# Patient Record
Sex: FEMALE | Race: WHITE | Employment: FULL TIME | URBAN - METROPOLITAN AREA
[De-identification: names, ages, dates, MRNs, and addresses within clinical notes are randomized per-mention and may not be internally consistent; named-entity substitution may affect disease eponyms.]

---

## 2017-08-16 PROBLEM — E06.3 HASHIMOTO'S DISEASE: Status: ACTIVE | Noted: 2017-08-16

## 2017-08-16 PROBLEM — R91.1 PULMONARY NODULE: Status: ACTIVE | Noted: 2017-02-22

## 2017-10-20 ENCOUNTER — HOSPITAL ENCOUNTER (OUTPATIENT)
Dept: LAB | Age: 57
Discharge: HOME | End: 2017-10-20

## 2017-10-20 PROCEDURE — 88305 TISSUE EXAM BY PATHOLOGIST: CPT | Performed by: INTERNAL MEDICINE

## 2018-09-28 ENCOUNTER — HOSPITAL ENCOUNTER (OUTPATIENT)
Dept: ULTRASOUND IMAGING | Age: 58
Discharge: HOME | End: 2018-09-28
Attending: FAMILY MEDICINE
Payer: COMMERCIAL

## 2018-09-28 ENCOUNTER — HOSPITAL ENCOUNTER (OUTPATIENT)
Dept: NON INVASIVE DIAGNOSTICS | Age: 58
Discharge: HOME | End: 2018-09-28
Attending: FAMILY MEDICINE
Payer: COMMERCIAL

## 2018-09-28 DIAGNOSIS — R07.89 CHEST TIGHTNESS OR PRESSURE: ICD-10-CM

## 2018-09-28 DIAGNOSIS — R55 PRE-SYNCOPE: ICD-10-CM

## 2018-09-28 PROCEDURE — 93306 TTE W/DOPPLER COMPLETE: CPT

## 2018-09-28 PROCEDURE — 93880 EXTRACRANIAL BILAT STUDY: CPT

## 2018-09-30 NOTE — PROGRESS NOTES
Let know Echo shows mild to moderate mitral valve regurgitation, but otherwise, ok. Shouldn't be causing her sxs. Carotid arteries are ok. Please proceed with MR Head without contrast due to persistent dizziness/pre-syncope.

## 2018-10-03 PROBLEM — J45.20 MILD INTERMITTENT ASTHMA WITHOUT COMPLICATION: Status: ACTIVE | Noted: 2018-10-03

## 2018-10-03 PROBLEM — J47.9 BRONCHIECTASIS WITHOUT COMPLICATION (HCC): Status: ACTIVE | Noted: 2018-10-03

## 2018-10-19 ENCOUNTER — HOSPITAL ENCOUNTER (OUTPATIENT)
Dept: MRI IMAGING | Age: 58
Discharge: HOME | End: 2018-10-19
Attending: FAMILY MEDICINE
Payer: COMMERCIAL

## 2018-10-19 DIAGNOSIS — R42 DIZZINESS: ICD-10-CM

## 2018-10-19 DIAGNOSIS — R26.81 GAIT INSTABILITY: ICD-10-CM

## 2018-10-19 DIAGNOSIS — H53.9 VISUAL CHANGES: ICD-10-CM

## 2018-10-19 PROCEDURE — 70551 MRI BRAIN STEM W/O DYE: CPT

## 2018-10-20 NOTE — PROGRESS NOTES
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 Please bring in for follow up with me when convenient.

## 2021-07-15 ENCOUNTER — HOSPITAL ENCOUNTER (OUTPATIENT)
Dept: MAMMOGRAPHY | Age: 61
Discharge: HOME | End: 2021-07-15
Attending: FAMILY MEDICINE
Payer: COMMERCIAL

## 2021-07-15 DIAGNOSIS — Z12.31 SCREENING MAMMOGRAM, ENCOUNTER FOR: ICD-10-CM

## 2021-07-15 PROCEDURE — 77067 SCR MAMMO BI INCL CAD: CPT

## 2021-07-30 PROBLEM — N93.0 POSTCOITAL BLEEDING: Status: ACTIVE | Noted: 2021-07-30

## 2021-08-20 PROBLEM — N94.10 DYSPAREUNIA IN FEMALE: Status: ACTIVE | Noted: 2021-08-20

## 2021-11-23 PROBLEM — E78.2 MIXED HYPERLIPIDEMIA: Status: ACTIVE | Noted: 2021-11-23

## 2022-03-18 PROBLEM — N93.0 POSTCOITAL BLEEDING: Status: ACTIVE | Noted: 2021-07-30

## 2022-03-19 PROBLEM — J45.20 MILD INTERMITTENT ASTHMA WITHOUT COMPLICATION: Status: ACTIVE | Noted: 2018-10-03

## 2022-03-19 PROBLEM — R91.1 PULMONARY NODULE: Status: ACTIVE | Noted: 2017-02-22

## 2022-03-19 PROBLEM — N94.10 DYSPAREUNIA IN FEMALE: Status: ACTIVE | Noted: 2021-08-20

## 2022-03-19 PROBLEM — E78.2 MIXED HYPERLIPIDEMIA: Status: ACTIVE | Noted: 2021-11-23

## 2022-03-20 PROBLEM — J47.9 BRONCHIECTASIS WITHOUT COMPLICATION (HCC): Status: ACTIVE | Noted: 2018-10-03

## 2022-03-20 PROBLEM — E06.3 HASHIMOTO'S DISEASE: Status: ACTIVE | Noted: 2017-08-16

## 2022-05-23 ENCOUNTER — OFFICE VISIT (OUTPATIENT)
Dept: INTERNAL MEDICINE CLINIC | Facility: CLINIC | Age: 62
End: 2022-05-23
Payer: COMMERCIAL

## 2022-05-23 VITALS
BODY MASS INDEX: 21.51 KG/M2 | RESPIRATION RATE: 16 BRPM | SYSTOLIC BLOOD PRESSURE: 116 MMHG | DIASTOLIC BLOOD PRESSURE: 68 MMHG | WEIGHT: 126 LBS | HEIGHT: 64 IN | OXYGEN SATURATION: 98 % | HEART RATE: 64 BPM

## 2022-05-23 DIAGNOSIS — F41.1 GENERALIZED ANXIETY DISORDER: ICD-10-CM

## 2022-05-23 DIAGNOSIS — R30.0 DYSURIA: ICD-10-CM

## 2022-05-23 DIAGNOSIS — J45.20 MILD INTERMITTENT ASTHMA WITHOUT COMPLICATION: ICD-10-CM

## 2022-05-23 DIAGNOSIS — R13.19 OTHER DYSPHAGIA: Primary | ICD-10-CM

## 2022-05-23 DIAGNOSIS — B37.31 VAGINAL YEAST INFECTION: ICD-10-CM

## 2022-05-23 PROCEDURE — 99214 OFFICE O/P EST MOD 30 MIN: CPT | Performed by: FAMILY MEDICINE

## 2022-05-23 RX ORDER — LORAZEPAM 0.5 MG/1
0.5 TABLET ORAL 2 TIMES DAILY
Qty: 60 TABLET | Refills: 2 | Status: SHIPPED | OUTPATIENT
Start: 2022-05-23 | End: 2022-10-14 | Stop reason: SDUPTHER

## 2022-05-23 RX ORDER — FLUCONAZOLE 150 MG/1
TABLET ORAL
Qty: 2 TABLET | Refills: 0 | Status: SHIPPED | OUTPATIENT
Start: 2022-05-23 | End: 2022-10-14

## 2022-05-23 RX ORDER — FLUTICASONE FUROATE AND VILANTEROL TRIFENATATE 100; 25 UG/1; UG/1
1 POWDER RESPIRATORY (INHALATION) DAILY
Qty: 1 EACH | Refills: 5 | Status: SHIPPED | OUTPATIENT
Start: 2022-05-23

## 2022-05-23 RX ORDER — PHENAZOPYRIDINE HYDROCHLORIDE 100 MG/1
100 TABLET, FILM COATED ORAL 3 TIMES DAILY PRN
Qty: 30 TABLET | Refills: 2 | Status: SHIPPED | OUTPATIENT
Start: 2022-05-23 | End: 2022-06-22

## 2022-05-23 ASSESSMENT — PATIENT HEALTH QUESTIONNAIRE - PHQ9
SUM OF ALL RESPONSES TO PHQ QUESTIONS 1-9: 0
2. FEELING DOWN, DEPRESSED OR HOPELESS: 0
SUM OF ALL RESPONSES TO PHQ QUESTIONS 1-9: 0
1. LITTLE INTEREST OR PLEASURE IN DOING THINGS: 0
SUM OF ALL RESPONSES TO PHQ QUESTIONS 1-9: 0
SUM OF ALL RESPONSES TO PHQ9 QUESTIONS 1 & 2: 0
SUM OF ALL RESPONSES TO PHQ QUESTIONS 1-9: 0

## 2022-05-23 ASSESSMENT — ANXIETY QUESTIONNAIRES
2. NOT BEING ABLE TO STOP OR CONTROL WORRYING: 0
5. BEING SO RESTLESS THAT IT IS HARD TO SIT STILL: 0
3. WORRYING TOO MUCH ABOUT DIFFERENT THINGS: 0
4. TROUBLE RELAXING: 0
6. BECOMING EASILY ANNOYED OR IRRITABLE: 0
7. FEELING AFRAID AS IF SOMETHING AWFUL MIGHT HAPPEN: 0
IF YOU CHECKED OFF ANY PROBLEMS ON THIS QUESTIONNAIRE, HOW DIFFICULT HAVE THESE PROBLEMS MADE IT FOR YOU TO DO YOUR WORK, TAKE CARE OF THINGS AT HOME, OR GET ALONG WITH OTHER PEOPLE: NOT DIFFICULT AT ALL
GAD7 TOTAL SCORE: 0
1. FEELING NERVOUS, ANXIOUS, OR ON EDGE: 0

## 2022-05-24 NOTE — PROGRESS NOTES
Rebecca Hager DO  Diplomate of the [employer] of [address] [place] [address] Internal Medicine      Catalina Gurrola (: 1960) is a 57 y.o. female, here for evaluation of the following chief complaint(s): Anxiety, Dysphagia, and Vaginitis       ASSESSMENT/PLAN:  1. Other dysphagia  -     [facility] - [facility]  2. Vaginal yeast infection  -     fluconazole (DIFLUCAN) 150 MG tablet; Take x 1 dose. May repeat in 48 hours if no better. , Disp-2 tablet, R-0Normal  3. Mild intermittent asthma without complication  -     fluticasone-vilanterol (BREO ELLIPTA) 100-25 MCG/INH AEPB inhaler; Inhale 1 puff into the lungs daily, Disp-1 each, R-5Normal  4. Generalized anxiety disorder  -     LORazepam (ATIVAN) 0.5 MG tablet; Take 1 tablet by mouth 2 times daily for 30 days. , Disp-60 tablet, R-2Normal  5. Dysuria  -     phenazopyridine (PYRIDIUM) 100 MG tablet; Take 1 tablet by mouth 3 times daily as needed (Dysuria), Disp-30 tablet, R-2Normal     -Aspiration precautions. Start zantac 150mg BID. Will get in with GI for endoscopy. -Diflucan as ordered for vaginal yeast infection.  -Will continue with Breo as she is getting good clinical benefit.  -Lorazepam for anxiety as tolerating without adverse effects. Advised her how to take medication properly. Discussed the potential side effects--including addiction--and benefits of the medication. She is to call with any problems or concerns.  -I have reviewed the patients controlled substance prescription history, as maintained in the [place] prescription monitoring program, so that the prescription(s) for a controlled substance can be given. -Pyridium prn for dysuria. SUBJECTIVE/OBJECTIVE:  HPI:  -Has been having some difficulty with swallowing over the past few months. Sometimes feels like her food is getting stuck. No aspiration.   Has been diagnosed with hiatal hernia in the past.  Also has intermittent GERD and has done well with zantac in the past.  -Has been having some mild vaginal discharge with itching. No foul smelling discharge.  -Has chronic intermittent dysuria and takes pyridium on prn basis for this.  -Follow up of asthma, not currently in exacerbation. Asthma symptoms occur: infrequently. Current limitations in activity from asthma: none. -Anxiety controlled with medication. Has tried SSRI/SNRI's in past and had adverse effects from them. ROS negative except as noted above today. Social History     Tobacco Use    Smoking status: Never Smoker    Smokeless tobacco: Never Used   Vaping Use    Vaping Use: Never used   Substance Use Topics    Alcohol use: No    Drug use: No     Vitals:    05/23/22 1615   BP: 116/68   Site: Left Upper Arm   Pulse: 64   Resp: 16   SpO2: 98%   Weight: 126 lb (57.2 kg)   Height: 5' 4\" (1.626 m)      Body mass index is 21.63 kg/m². Physical Exam  Vitals reviewed. Cardiovascular:      Rate and Rhythm: Normal rate and regular rhythm. Pulmonary:      Effort: Pulmonary effort is normal.      Breath sounds: Normal breath sounds. Abdominal:      General: Abdomen is flat. Bowel sounds are normal.      Palpations: Abdomen is soft. Tenderness: There is no abdominal tenderness. Skin:     General: Skin is warm and dry. Neurological:      Mental Status: She is alert. An electronic signature was used to authenticate this note.   Lorena Hernandez DO

## 2022-06-08 ENCOUNTER — NURSE ONLY (OUTPATIENT)
Dept: INTERNAL MEDICINE CLINIC | Facility: CLINIC | Age: 62
End: 2022-06-08
Payer: COMMERCIAL

## 2022-06-08 DIAGNOSIS — E53.8 B12 DEFICIENCY: Primary | ICD-10-CM

## 2022-06-08 PROCEDURE — 96372 THER/PROPH/DIAG INJ SC/IM: CPT | Performed by: FAMILY MEDICINE

## 2022-06-08 RX ORDER — CYANOCOBALAMIN 1000 UG/ML
1000 INJECTION INTRAMUSCULAR; SUBCUTANEOUS ONCE
Status: COMPLETED | OUTPATIENT
Start: 2022-06-08 | End: 2022-06-08

## 2022-06-08 RX ADMIN — CYANOCOBALAMIN 1000 MCG: 1000 INJECTION INTRAMUSCULAR; SUBCUTANEOUS at 16:10

## 2022-07-06 ENCOUNTER — NURSE ONLY (OUTPATIENT)
Dept: INTERNAL MEDICINE CLINIC | Facility: CLINIC | Age: 62
End: 2022-07-06
Payer: COMMERCIAL

## 2022-07-06 DIAGNOSIS — E53.8 B12 DEFICIENCY: Primary | ICD-10-CM

## 2022-07-06 PROCEDURE — 96372 THER/PROPH/DIAG INJ SC/IM: CPT | Performed by: FAMILY MEDICINE

## 2022-07-06 RX ORDER — CYANOCOBALAMIN 1000 UG/ML
1000 INJECTION INTRAMUSCULAR; SUBCUTANEOUS ONCE
Status: COMPLETED | OUTPATIENT
Start: 2022-07-06 | End: 2022-07-06

## 2022-07-06 RX ADMIN — CYANOCOBALAMIN 1000 MCG: 1000 INJECTION INTRAMUSCULAR; SUBCUTANEOUS at 09:40

## 2022-08-09 ENCOUNTER — NURSE ONLY (OUTPATIENT)
Dept: INTERNAL MEDICINE CLINIC | Facility: CLINIC | Age: 62
End: 2022-08-09
Payer: COMMERCIAL

## 2022-08-09 DIAGNOSIS — E53.8 B12 DEFICIENCY: Primary | ICD-10-CM

## 2022-08-09 PROCEDURE — 96372 THER/PROPH/DIAG INJ SC/IM: CPT | Performed by: INTERNAL MEDICINE

## 2022-08-09 RX ORDER — CYANOCOBALAMIN 1000 UG/ML
1000 INJECTION INTRAMUSCULAR; SUBCUTANEOUS ONCE
Status: COMPLETED | OUTPATIENT
Start: 2022-08-09 | End: 2022-08-09

## 2022-08-09 RX ADMIN — CYANOCOBALAMIN 1000 MCG: 1000 INJECTION INTRAMUSCULAR; SUBCUTANEOUS at 16:33

## 2022-09-08 ENCOUNTER — OFFICE VISIT (OUTPATIENT)
Dept: INTERNAL MEDICINE CLINIC | Facility: CLINIC | Age: 62
End: 2022-09-08
Payer: COMMERCIAL

## 2022-09-08 VITALS
HEART RATE: 68 BPM | SYSTOLIC BLOOD PRESSURE: 102 MMHG | HEIGHT: 64 IN | BODY MASS INDEX: 21.68 KG/M2 | DIASTOLIC BLOOD PRESSURE: 70 MMHG | OXYGEN SATURATION: 99 % | WEIGHT: 127 LBS

## 2022-09-08 DIAGNOSIS — E53.8 B12 DEFICIENCY: ICD-10-CM

## 2022-09-08 DIAGNOSIS — H93.8X1 SENSATION OF FULLNESS IN RIGHT EAR: Primary | ICD-10-CM

## 2022-09-08 PROCEDURE — 99213 OFFICE O/P EST LOW 20 MIN: CPT | Performed by: INTERNAL MEDICINE

## 2022-09-08 PROCEDURE — 96372 THER/PROPH/DIAG INJ SC/IM: CPT | Performed by: INTERNAL MEDICINE

## 2022-09-08 RX ORDER — OMEPRAZOLE 40 MG/1
CAPSULE, DELAYED RELEASE ORAL
COMMUNITY
Start: 2022-08-24 | End: 2022-10-14 | Stop reason: ALTCHOICE

## 2022-09-08 RX ORDER — CYANOCOBALAMIN 1000 UG/ML
1000 INJECTION INTRAMUSCULAR; SUBCUTANEOUS ONCE
Status: COMPLETED | OUTPATIENT
Start: 2022-09-08 | End: 2022-09-08

## 2022-09-08 RX ADMIN — CYANOCOBALAMIN 1000 MCG: 1000 INJECTION INTRAMUSCULAR; SUBCUTANEOUS at 11:58

## 2022-09-08 ASSESSMENT — ENCOUNTER SYMPTOMS
ABDOMINAL PAIN: 0
RHINORRHEA: 0
SORE THROAT: 0
COUGH: 0

## 2022-09-08 ASSESSMENT — PATIENT HEALTH QUESTIONNAIRE - PHQ9
2. FEELING DOWN, DEPRESSED OR HOPELESS: 0
SUM OF ALL RESPONSES TO PHQ9 QUESTIONS 1 & 2: 0
SUM OF ALL RESPONSES TO PHQ QUESTIONS 1-9: 0
1. LITTLE INTEREST OR PLEASURE IN DOING THINGS: 0
SUM OF ALL RESPONSES TO PHQ QUESTIONS 1-9: 0

## 2022-09-08 NOTE — PROGRESS NOTES
Dipti Martínez (:  1960) is a 60 y.o. female,Established patient, here for evaluation of the following chief complaint(s):  Otalgia (Right Ear/) and Injections (B-12)         ASSESSMENT/PLAN:  1. Sensation of fullness in right ear  2. B12 deficiency  -     cyanocobalamin injection 1,000 mcg; 1,000 mcg, IntraMUSCular, ONCE, 1 dose, On Thu 22 at 1215      Return if symptoms worsen or fail to improve. Patient with fullness in right ear, pain that resolved  Bilateral ears with clear serous fluid  Discussed using nasal spray during season changes, nasal saline  Gentle insufflation  F/u if worsening or no improvement      Subjective   SUBJECTIVE/OBJECTIVE:  Otalgia   There is pain in the right ear. This is a new problem. The current episode started 1 to 4 weeks ago. The problem has been gradually improving. There has been no fever. The pain is mild. Pertinent negatives include no abdominal pain, coughing, ear discharge, headaches, hearing loss, neck pain, rhinorrhea or sore throat. Associated symptoms comments: Fluttering noise and sensation in the right ear  . Review of Systems   HENT:  Positive for ear pain. Negative for ear discharge, hearing loss, rhinorrhea and sore throat. Respiratory:  Negative for cough. Gastrointestinal:  Negative for abdominal pain. Musculoskeletal:  Negative for neck pain. Neurological:  Negative for headaches. Objective   Physical Exam  Constitutional:       Appearance: Normal appearance. She is not ill-appearing. HENT:      Head: Normocephalic. Right Ear: Ear canal and external ear normal. A middle ear effusion is present. Tympanic membrane is not erythematous. Left Ear: Ear canal and external ear normal. A middle ear effusion is present. Tympanic membrane is not erythematous. Nose: Nose normal. No congestion. Eyes:      Extraocular Movements: Extraocular movements intact. Pupils: Pupils are equal, round, and reactive to light.

## 2022-10-06 ENCOUNTER — NURSE ONLY (OUTPATIENT)
Dept: INTERNAL MEDICINE CLINIC | Facility: CLINIC | Age: 62
End: 2022-10-06
Payer: COMMERCIAL

## 2022-10-06 DIAGNOSIS — E53.8 B12 DEFICIENCY: Primary | ICD-10-CM

## 2022-10-06 PROCEDURE — 96372 THER/PROPH/DIAG INJ SC/IM: CPT | Performed by: FAMILY MEDICINE

## 2022-10-06 RX ORDER — CYANOCOBALAMIN 1000 UG/ML
1000 INJECTION INTRAMUSCULAR; SUBCUTANEOUS ONCE
Status: COMPLETED | OUTPATIENT
Start: 2022-10-06 | End: 2022-10-06

## 2022-10-06 RX ADMIN — CYANOCOBALAMIN 1000 MCG: 1000 INJECTION INTRAMUSCULAR; SUBCUTANEOUS at 08:58

## 2022-10-14 ENCOUNTER — OFFICE VISIT (OUTPATIENT)
Dept: INTERNAL MEDICINE CLINIC | Facility: CLINIC | Age: 62
End: 2022-10-14
Payer: COMMERCIAL

## 2022-10-14 VITALS
RESPIRATION RATE: 16 BRPM | HEART RATE: 78 BPM | BODY MASS INDEX: 20.32 KG/M2 | SYSTOLIC BLOOD PRESSURE: 120 MMHG | WEIGHT: 119 LBS | OXYGEN SATURATION: 100 % | DIASTOLIC BLOOD PRESSURE: 70 MMHG | HEIGHT: 64 IN

## 2022-10-14 DIAGNOSIS — K21.9 GASTROESOPHAGEAL REFLUX DISEASE WITHOUT ESOPHAGITIS: ICD-10-CM

## 2022-10-14 DIAGNOSIS — F41.1 GENERALIZED ANXIETY DISORDER: ICD-10-CM

## 2022-10-14 DIAGNOSIS — K22.2 ESOPHAGEAL STRICTURE: Primary | ICD-10-CM

## 2022-10-14 PROCEDURE — 99214 OFFICE O/P EST MOD 30 MIN: CPT | Performed by: FAMILY MEDICINE

## 2022-10-14 RX ORDER — PANTOPRAZOLE SODIUM 40 MG/1
TABLET, DELAYED RELEASE ORAL
COMMUNITY
Start: 2022-10-02 | End: 2022-10-14 | Stop reason: ALTCHOICE

## 2022-10-14 RX ORDER — FAMOTIDINE 20 MG/1
20 TABLET, FILM COATED ORAL 2 TIMES DAILY PRN
Qty: 180 TABLET | Refills: 1 | Status: SHIPPED | OUTPATIENT
Start: 2022-10-14

## 2022-10-14 RX ORDER — LORAZEPAM 0.5 MG/1
0.5 TABLET ORAL 2 TIMES DAILY
Qty: 60 TABLET | Refills: 2 | Status: SHIPPED | OUTPATIENT
Start: 2022-10-14 | End: 2022-11-13

## 2022-10-14 ASSESSMENT — PATIENT HEALTH QUESTIONNAIRE - PHQ9
SUM OF ALL RESPONSES TO PHQ QUESTIONS 1-9: 0
SUM OF ALL RESPONSES TO PHQ QUESTIONS 1-9: 0
1. LITTLE INTEREST OR PLEASURE IN DOING THINGS: 0
SUM OF ALL RESPONSES TO PHQ QUESTIONS 1-9: 0
2. FEELING DOWN, DEPRESSED OR HOPELESS: 0
SUM OF ALL RESPONSES TO PHQ QUESTIONS 1-9: 0
SUM OF ALL RESPONSES TO PHQ9 QUESTIONS 1 & 2: 0

## 2022-10-14 NOTE — PROGRESS NOTES
Curt Herron DO  Diplomate of the [employer] of [facility] Internal Medicine      Catarina Red (: 1960) is a 60 y.o. female, here for evaluation of the following chief complaint(s):  Gastroesophageal Reflux       ASSESSMENT/PLAN:  1. Esophageal stricture  -     famotidine (PEPCID) 20 MG tablet; Take 1 tablet by mouth 2 times daily as needed (Indigestion), Disp-180 tablet, R-1Normal  2. Gastroesophageal reflux disease without esophagitis  -     famotidine (PEPCID) 20 MG tablet; Take 1 tablet by mouth 2 times daily as needed (Indigestion), Disp-180 tablet, R-1Normal  3. Generalized anxiety disorder  -     LORazepam (ATIVAN) 0.5 MG tablet; Take 1 tablet by mouth 2 times daily for 30 days. , Disp-60 tablet, R-2Normal     -Anti reflux precautions. Will use pepcid daily-BID prn. Monitor.  -Will continue with Breo as tolerating well and providing good clinical beneift.  -Advised to get age appropriate flu vaccine.  -Tolerating medication well without adverse effects and providing good therapeutic benefit for anxiety. Will continue with ativan prn and advised to let us know for any worsening symptoms. -Advised her how to take medication properly. Discussed the potential side effects--including addiction--and benefits of the medication. She is to call with any problems or concerns.  -I have reviewed the patients controlled substance prescription history, as maintained in the [place] prescription monitoring program, so that the prescription(s) for a controlled substance can be given. -Reviewed GI consult. SUBJECTIVE/OBJECTIVE:  HPI:  -Pt recently underwent esophageal dilation, doing much better now. Swallowing well.  -Had allergic reaction to ppi's. States she developed rash on chest.  Improving after stopping. Still having occasional reflux sxs.   -Continues to use Breo daily. Has not had to use albuterol. No productive cough/SOB.   -Anxiety stable, has been under more stress due to losing job. Moods stable. Tolerating medication well. ROS negative except as noted above today. Social History     Tobacco Use    Smoking status: Never    Smokeless tobacco: Never   Vaping Use    Vaping Use: Never used   Substance Use Topics    Alcohol use: No    Drug use: No     Vitals:    10/14/22 0816   BP: 120/70   Site: Left Upper Arm   Position: Sitting   Pulse: 78   Resp: 16   SpO2: 100%   Weight: 119 lb (54 kg)   Height: 5' 4\" (1.626 m)      Body mass index is 20.43 kg/m². Physical Exam  Vitals reviewed. Cardiovascular:      Rate and Rhythm: Normal rate and regular rhythm. Pulmonary:      Effort: Pulmonary effort is normal.      Breath sounds: Normal breath sounds. Abdominal:      General: Abdomen is flat. Bowel sounds are normal.      Palpations: Abdomen is soft. Tenderness: There is no abdominal tenderness. Skin:     General: Skin is warm and dry. Neurological:      Mental Status: She is alert. An electronic signature was used to authenticate this note.   Mari Butts DO

## 2022-11-07 ENCOUNTER — NURSE ONLY (OUTPATIENT)
Dept: INTERNAL MEDICINE CLINIC | Facility: CLINIC | Age: 62
End: 2022-11-07
Payer: COMMERCIAL

## 2022-11-07 DIAGNOSIS — E53.8 B12 DEFICIENCY: Primary | ICD-10-CM

## 2022-11-07 PROCEDURE — 96372 THER/PROPH/DIAG INJ SC/IM: CPT | Performed by: FAMILY MEDICINE

## 2022-11-07 RX ORDER — CYANOCOBALAMIN 1000 UG/ML
1000 INJECTION, SOLUTION INTRAMUSCULAR; SUBCUTANEOUS ONCE
Status: COMPLETED | OUTPATIENT
Start: 2022-11-07 | End: 2022-11-07

## 2022-11-07 RX ADMIN — CYANOCOBALAMIN 1000 MCG: 1000 INJECTION, SOLUTION INTRAMUSCULAR; SUBCUTANEOUS at 08:57

## 2022-12-05 ENCOUNTER — NURSE ONLY (OUTPATIENT)
Dept: INTERNAL MEDICINE CLINIC | Facility: CLINIC | Age: 62
End: 2022-12-05
Payer: COMMERCIAL

## 2022-12-05 DIAGNOSIS — E53.8 B12 DEFICIENCY: Primary | ICD-10-CM

## 2022-12-05 PROCEDURE — 96372 THER/PROPH/DIAG INJ SC/IM: CPT | Performed by: FAMILY MEDICINE

## 2022-12-05 RX ORDER — CYANOCOBALAMIN 1000 UG/ML
1000 INJECTION, SOLUTION INTRAMUSCULAR; SUBCUTANEOUS ONCE
Status: COMPLETED | OUTPATIENT
Start: 2022-12-05 | End: 2022-12-05

## 2022-12-05 RX ADMIN — CYANOCOBALAMIN 1000 MCG: 1000 INJECTION, SOLUTION INTRAMUSCULAR; SUBCUTANEOUS at 08:50

## 2023-01-05 ENCOUNTER — NURSE ONLY (OUTPATIENT)
Dept: INTERNAL MEDICINE CLINIC | Facility: CLINIC | Age: 63
End: 2023-01-05
Payer: COMMERCIAL

## 2023-01-05 DIAGNOSIS — E53.8 B12 DEFICIENCY: Primary | ICD-10-CM

## 2023-01-05 PROCEDURE — 96372 THER/PROPH/DIAG INJ SC/IM: CPT | Performed by: INTERNAL MEDICINE

## 2023-01-05 RX ORDER — CYANOCOBALAMIN 1000 UG/ML
1000 INJECTION, SOLUTION INTRAMUSCULAR; SUBCUTANEOUS
Status: SHIPPED | OUTPATIENT
Start: 2023-01-05

## 2023-01-05 RX ADMIN — CYANOCOBALAMIN 1000 MCG: 1000 INJECTION, SOLUTION INTRAMUSCULAR; SUBCUTANEOUS at 08:44

## 2023-01-16 ENCOUNTER — OFFICE VISIT (OUTPATIENT)
Dept: INTERNAL MEDICINE CLINIC | Facility: CLINIC | Age: 63
End: 2023-01-16
Payer: COMMERCIAL

## 2023-01-16 VITALS
HEART RATE: 64 BPM | HEIGHT: 64 IN | OXYGEN SATURATION: 98 % | BODY MASS INDEX: 18.95 KG/M2 | WEIGHT: 111 LBS | DIASTOLIC BLOOD PRESSURE: 70 MMHG | RESPIRATION RATE: 16 BRPM | SYSTOLIC BLOOD PRESSURE: 130 MMHG

## 2023-01-16 DIAGNOSIS — E06.3 HASHIMOTO'S DISEASE: ICD-10-CM

## 2023-01-16 DIAGNOSIS — J47.9 BRONCHIECTASIS, UNCOMPLICATED (HCC): Primary | ICD-10-CM

## 2023-01-16 DIAGNOSIS — F41.0 PANIC DISORDER WITHOUT AGORAPHOBIA: ICD-10-CM

## 2023-01-16 DIAGNOSIS — E78.2 MIXED HYPERLIPIDEMIA: ICD-10-CM

## 2023-01-16 DIAGNOSIS — K21.9 GASTROESOPHAGEAL REFLUX DISEASE WITHOUT ESOPHAGITIS: ICD-10-CM

## 2023-01-16 DIAGNOSIS — J45.20 MILD INTERMITTENT ASTHMA WITHOUT COMPLICATION: ICD-10-CM

## 2023-01-16 PROBLEM — N93.0 POSTCOITAL BLEEDING: Status: RESOLVED | Noted: 2021-07-30 | Resolved: 2023-01-16

## 2023-01-16 LAB
ERYTHROCYTE [DISTWIDTH] IN BLOOD BY AUTOMATED COUNT: 13.2 % (ref 11.9–14.6)
HCT VFR BLD AUTO: 40.2 % (ref 35.8–46.3)
HGB BLD-MCNC: 12.7 G/DL (ref 11.7–15.4)
MCH RBC QN AUTO: 29.1 PG (ref 26.1–32.9)
MCHC RBC AUTO-ENTMCNC: 31.6 G/DL (ref 31.4–35)
MCV RBC AUTO: 92.2 FL (ref 82–102)
NRBC # BLD: 0 K/UL (ref 0–0.2)
PLATELET # BLD AUTO: 261 K/UL (ref 150–450)
PMV BLD AUTO: 12.4 FL (ref 9.4–12.3)
RBC # BLD AUTO: 4.36 M/UL (ref 4.05–5.2)
WBC # BLD AUTO: 8.7 K/UL (ref 4.3–11.1)

## 2023-01-16 PROCEDURE — 99214 OFFICE O/P EST MOD 30 MIN: CPT | Performed by: FAMILY MEDICINE

## 2023-01-16 RX ORDER — LORAZEPAM 0.5 MG/1
TABLET ORAL
COMMUNITY
Start: 2022-12-14 | End: 2023-01-16 | Stop reason: SDUPTHER

## 2023-01-16 RX ORDER — FAMOTIDINE 20 MG/1
20 TABLET, FILM COATED ORAL 2 TIMES DAILY PRN
Qty: 180 TABLET | Refills: 1 | Status: SHIPPED | OUTPATIENT
Start: 2023-01-16

## 2023-01-16 RX ORDER — FLUTICASONE FUROATE AND VILANTEROL 100; 25 UG/1; UG/1
1 POWDER RESPIRATORY (INHALATION) DAILY
Qty: 1 EACH | Refills: 5 | Status: SHIPPED | OUTPATIENT
Start: 2023-01-16

## 2023-01-16 RX ORDER — LORAZEPAM 0.5 MG/1
0.5 TABLET ORAL DAILY PRN
Qty: 30 TABLET | Refills: 2 | Status: SHIPPED | OUTPATIENT
Start: 2023-01-16 | End: 2023-02-15

## 2023-01-16 RX ORDER — FLUTICASONE FUROATE AND VILANTEROL 100; 25 UG/1; UG/1
POWDER RESPIRATORY (INHALATION)
Qty: 1 EACH | Refills: 5 | Status: CANCELLED | OUTPATIENT
Start: 2023-01-16

## 2023-01-16 RX ORDER — FLUTICASONE PROPIONATE 50 MCG
2 SPRAY, SUSPENSION (ML) NASAL DAILY
Qty: 16 G | Refills: 5 | Status: SHIPPED | OUTPATIENT
Start: 2023-01-16

## 2023-01-16 SDOH — ECONOMIC STABILITY: FOOD INSECURITY: WITHIN THE PAST 12 MONTHS, THE FOOD YOU BOUGHT JUST DIDN'T LAST AND YOU DIDN'T HAVE MONEY TO GET MORE.: NEVER TRUE

## 2023-01-16 SDOH — ECONOMIC STABILITY: FOOD INSECURITY: WITHIN THE PAST 12 MONTHS, YOU WORRIED THAT YOUR FOOD WOULD RUN OUT BEFORE YOU GOT MONEY TO BUY MORE.: NEVER TRUE

## 2023-01-16 ASSESSMENT — PATIENT HEALTH QUESTIONNAIRE - PHQ9
SUM OF ALL RESPONSES TO PHQ QUESTIONS 1-9: 0
SUM OF ALL RESPONSES TO PHQ9 QUESTIONS 1 & 2: 0
1. LITTLE INTEREST OR PLEASURE IN DOING THINGS: 0
SUM OF ALL RESPONSES TO PHQ QUESTIONS 1-9: 0
2. FEELING DOWN, DEPRESSED OR HOPELESS: 0

## 2023-01-16 ASSESSMENT — SOCIAL DETERMINANTS OF HEALTH (SDOH): HOW HARD IS IT FOR YOU TO PAY FOR THE VERY BASICS LIKE FOOD, HOUSING, MEDICAL CARE, AND HEATING?: NOT HARD AT ALL

## 2023-01-16 NOTE — PROGRESS NOTES
Abbey Preston DO  Diplomate of the [employer] of [address] [place] [address] Internal Medicine      Domingo Conroy (: 1960) is a 60 y.o. female, here for evaluation of the following chief complaint(s):  Gastroesophageal Reflux and Asthma       ASSESSMENT/PLAN:  1. Bronchiectasis, uncomplicated (HCC)  -     Fluticasone Furoate-Vilanterol (BREO ELLIPTA) 100-25 MCG/ACT AEPB; Inhale 1 puff into the lungs daily, Disp-1 each, R-5Normal  -     CBC; Future  2. Gastroesophageal reflux disease without esophagitis  -     famotidine (PEPCID) 20 MG tablet; Take 1 tablet by mouth 2 times daily as needed (Indigestion), Disp-180 tablet, R-1Normal  3. Panic disorder without agoraphobia  -     LORazepam (ATIVAN) 0.5 MG tablet; Take 1 tablet by mouth daily as needed for Anxiety for up to 30 days. Max Daily Amount: 0.5 mg, Disp-30 tablet, R-2Normal  4. Hashimoto's disease  -     TSH with Reflex; Future  5. Mild intermittent asthma without complication  6. Mixed hyperlipidemia  -     Basic Metabolic Panel; Future  -     Hepatic Function Panel; Future  -     Lipid Panel; Future     Will check CT to ensure stability of bronchiectasis. Will continue with Breo daily as she is getting good clinical benefit from this and tolerating well. Pepcid daily to twice daily as needed for reflux symptoms. Will continue with lorazepam as needed for severe panic. Advised her how to take medication properly. Discussed the potential side effects--including addiction--and benefits of the medication. She is to call with any problems or concerns. I have reviewed the patients controlled substance prescription history, as maintained in the [place] prescription monitoring program, so that the prescription(s) for a controlled substance can be given. Recheck TSH. Recheck lipids and encourage healthy eating and exercise as able. SUBJECTIVE/OBJECTIVE:  HPI:  Breathing has been fairly stable.   She still has some intermittent problems with some congestion and shortness of breath at times. No fevers. No hemoptysis. She does continue on Breo daily. GERD has been controlled with Pepcid. She only takes intermittently. Continues to have a lot of issues with anxiety and panic at times. She has been on multiple different medications before and had different issues with tolerance to this. She does take lorazepam for severe symptoms and gets relief without significant adverse effects. ROS negative except as noted above today. Social History     Tobacco Use    Smoking status: Never    Smokeless tobacco: Never   Vaping Use    Vaping Use: Never used   Substance Use Topics    Alcohol use: No    Drug use: No     Vitals:    01/16/23 1019   BP: 130/70   Site: Left Upper Arm   Position: Sitting   Pulse: 64   Resp: 16   SpO2: 98%   Weight: 111 lb (50.3 kg)   Height: 5' 4\" (1.626 m)      Body mass index is 19.05 kg/m². Physical Exam  Vitals reviewed. HENT:      Head: Normocephalic and atraumatic. Mouth/Throat:      Mouth: Mucous membranes are moist.      Pharynx: Oropharynx is clear. Eyes:      Extraocular Movements: Extraocular movements intact. Conjunctiva/sclera: Conjunctivae normal.      Pupils: Pupils are equal, round, and reactive to light. Cardiovascular:      Rate and Rhythm: Normal rate and regular rhythm. Pulmonary:      Effort: Pulmonary effort is normal.      Comments: Decreased BS in AM  Abdominal:      General: Abdomen is flat. Palpations: Abdomen is soft. Musculoskeletal:         General: Normal range of motion. Cervical back: Normal range of motion and neck supple. Skin:     General: Skin is warm and dry. Neurological:      General: No focal deficit present. Mental Status: She is alert. An electronic signature was used to authenticate this note.   Amelia Bahena DO

## 2023-01-17 LAB
ALBUMIN SERPL-MCNC: 3.9 G/DL (ref 3.2–4.6)
ALBUMIN/GLOB SERPL: 1.1 (ref 0.4–1.6)
ALP SERPL-CCNC: 80 U/L (ref 50–136)
ALT SERPL-CCNC: 15 U/L (ref 12–65)
ANION GAP SERPL CALC-SCNC: 8 MMOL/L (ref 2–11)
AST SERPL-CCNC: 17 U/L (ref 15–37)
BILIRUB DIRECT SERPL-MCNC: <0.1 MG/DL
BILIRUB SERPL-MCNC: 0.9 MG/DL (ref 0.2–1.1)
BUN SERPL-MCNC: 14 MG/DL (ref 8–23)
CALCIUM SERPL-MCNC: 9.9 MG/DL (ref 8.3–10.4)
CHLORIDE SERPL-SCNC: 108 MMOL/L (ref 101–110)
CHOLEST SERPL-MCNC: 229 MG/DL
CO2 SERPL-SCNC: 26 MMOL/L (ref 21–32)
CREAT SERPL-MCNC: 1.1 MG/DL (ref 0.6–1)
GLOBULIN SER CALC-MCNC: 3.4 G/DL (ref 2.8–4.5)
GLUCOSE SERPL-MCNC: 100 MG/DL (ref 65–100)
HDLC SERPL-MCNC: 91 MG/DL (ref 40–60)
HDLC SERPL: 2.5
LDLC SERPL CALC-MCNC: 112.4 MG/DL
POTASSIUM SERPL-SCNC: 4.1 MMOL/L (ref 3.5–5.1)
PROT SERPL-MCNC: 7.3 G/DL (ref 6.3–8.2)
SODIUM SERPL-SCNC: 142 MMOL/L (ref 133–143)
TRIGL SERPL-MCNC: 128 MG/DL (ref 35–150)
TSH W FREE THYROID IF ABNORMAL: 1.05 UIU/ML (ref 0.36–3.74)
VLDLC SERPL CALC-MCNC: 25.6 MG/DL (ref 6–23)

## 2023-01-24 NOTE — RESULT ENCOUNTER NOTE
Let know pulmonary nodule a little bigger. Would like her to see Pulmonology to see if further evaluation needs to be done--pls refer-she has seen previously. Also, appears to have some benign cysts in liver--has had previously, but would like liver us to further evaluate--pls order.

## 2023-01-25 ENCOUNTER — OFFICE VISIT (OUTPATIENT)
Dept: INTERNAL MEDICINE CLINIC | Facility: CLINIC | Age: 63
End: 2023-01-25
Payer: COMMERCIAL

## 2023-01-25 VITALS
OXYGEN SATURATION: 99 % | WEIGHT: 108 LBS | RESPIRATION RATE: 16 BRPM | HEIGHT: 64 IN | BODY MASS INDEX: 18.44 KG/M2 | HEART RATE: 90 BPM | DIASTOLIC BLOOD PRESSURE: 78 MMHG | SYSTOLIC BLOOD PRESSURE: 138 MMHG

## 2023-01-25 DIAGNOSIS — R91.8 PULMONARY NODULES: ICD-10-CM

## 2023-01-25 DIAGNOSIS — K76.9 LIVER LESION: ICD-10-CM

## 2023-01-25 DIAGNOSIS — J47.9 BRONCHIECTASIS WITHOUT COMPLICATION (HCC): Primary | ICD-10-CM

## 2023-01-25 PROCEDURE — 99214 OFFICE O/P EST MOD 30 MIN: CPT | Performed by: FAMILY MEDICINE

## 2023-01-25 ASSESSMENT — PATIENT HEALTH QUESTIONNAIRE - PHQ9
SUM OF ALL RESPONSES TO PHQ QUESTIONS 1-9: 0
SUM OF ALL RESPONSES TO PHQ QUESTIONS 1-9: 0
1. LITTLE INTEREST OR PLEASURE IN DOING THINGS: 0
2. FEELING DOWN, DEPRESSED OR HOPELESS: 0
SUM OF ALL RESPONSES TO PHQ QUESTIONS 1-9: 0
SUM OF ALL RESPONSES TO PHQ QUESTIONS 1-9: 0
SUM OF ALL RESPONSES TO PHQ9 QUESTIONS 1 & 2: 0

## 2023-01-25 NOTE — PROGRESS NOTES
Lorin Sage DO  Diplomate of the [employer] of [address] [place] [address] Internal Medicine      Kimber Wells (: 1960) is a 60 y.o. female, here for evaluation of the following chief complaint(s):  Results       ASSESSMENT/PLAN:  1. Bronchiectasis without complication [facility])  -     External Referral to Pulmonology  2. Pulmonary nodules  -     External Referral to Pulmonology  3. Liver lesion    Reviewed CT results in detail with the patient. We are going to get her back in with her pulmonologist that she has not followed up with him in quite a while. We will let them determine next appropriate steps as far as work-up is concerned. Will continue with her Breo as providing clinical benefit--she states her symptoms improve as long as she is taking. We will go ahead and check MRI of liver to further evaluate lesions. Of note she did have these previously several years ago, but we will go ahead and update imaging to make sure no further change. I answered all the patient's questions to the best of my ability. SUBJECTIVE/OBJECTIVE:  HPI:  Patient comes in today for follow-up regarding her recent CT results. She does have a long history of bronchiectasis. She has seen pulmonology in the past, but has not followed up with them recently. She does admit to intermittently having some right anterior chest discomfort, especially with deep breathing. She has been using her inhaler daily. Denies any significant mucopurulent productive cough or hemoptysis. No fevers. ROS negative except as noted above today.     Social History     Tobacco Use    Smoking status: Never    Smokeless tobacco: Never   Vaping Use    Vaping Use: Never used   Substance Use Topics    Alcohol use: No    Drug use: No     Vitals:    23 1024   BP: 138/78   Site: Left Upper Arm   Position: Sitting   Pulse: 90   Resp: 16   SpO2: 99%   Weight: 108 lb (49 kg)   Height: 5' 4\" (1.626 m)      Body mass index is 18.54 kg/m². Physical Exam  Vitals reviewed. Cardiovascular:      Rate and Rhythm: Normal rate and regular rhythm. Pulmonary:      Effort: Pulmonary effort is normal.      Breath sounds: Normal breath sounds. Skin:     General: Skin is warm and dry. Neurological:      Mental Status: She is alert. An electronic signature was used to authenticate this note.   Surinder Santiago DO

## 2023-01-31 ENCOUNTER — OFFICE VISIT (OUTPATIENT)
Dept: INTERNAL MEDICINE CLINIC | Facility: CLINIC | Age: 63
End: 2023-01-31
Payer: COMMERCIAL

## 2023-01-31 VITALS
HEIGHT: 64 IN | BODY MASS INDEX: 18.44 KG/M2 | OXYGEN SATURATION: 98 % | SYSTOLIC BLOOD PRESSURE: 110 MMHG | HEART RATE: 82 BPM | DIASTOLIC BLOOD PRESSURE: 80 MMHG | WEIGHT: 108 LBS

## 2023-01-31 DIAGNOSIS — R91.1 PULMONARY NODULE: ICD-10-CM

## 2023-01-31 DIAGNOSIS — R04.2 COUGH WITH HEMOPTYSIS: Primary | ICD-10-CM

## 2023-01-31 DIAGNOSIS — J47.9 BRONCHIECTASIS WITHOUT COMPLICATION (HCC): ICD-10-CM

## 2023-01-31 PROCEDURE — 99214 OFFICE O/P EST MOD 30 MIN: CPT | Performed by: NURSE PRACTITIONER

## 2023-01-31 ASSESSMENT — PATIENT HEALTH QUESTIONNAIRE - PHQ9
SUM OF ALL RESPONSES TO PHQ QUESTIONS 1-9: 0
2. FEELING DOWN, DEPRESSED OR HOPELESS: 0
SUM OF ALL RESPONSES TO PHQ9 QUESTIONS 1 & 2: 0
SUM OF ALL RESPONSES TO PHQ QUESTIONS 1-9: 0
1. LITTLE INTEREST OR PLEASURE IN DOING THINGS: 0

## 2023-01-31 NOTE — PROGRESS NOTES
Uriel Eaton (:  1960) is a 60 y.o. female,Established patient, here for evaluation of the following chief complaint(s): Other (Esophageal concern/)         ASSESSMENT/PLAN:  1. Cough with hemoptysis  2. Pulmonary nodule  3. Bronchiectasis without complication ([facility] [address]    No follow-ups on file. Patient with 1 episode of cough with hemoptysis  Known pulmonary nodule, has referral to pulmonary  Reviewed prior CT  Discussed with PCP, will check quantiferon   Lungs clear and  pulse ox normal      Subjective   SUBJECTIVE/OBJECTIVE:  Patient is here for a concern that yesterday she coughed hard, after her cough she felt burning in her chest. Then she was able to cough up some mucus. Then when she went to the bathroom she spit in the toilet and there was blood in her spit. She hasn't had a cough or cold, she just coughed 1 x but very hard. She only spit up the blood the 1 x. She hasn't coughed since or had any more production of blood. She had esophageal procedure 5 months ago that concerned her as a source of blood. She also has a pulmonary nodule. Other  This is a new problem. The current episode started yesterday. Review of Systems       Objective   Physical Exam  Constitutional:       Appearance: Normal appearance. She is not ill-appearing. HENT:      Head: Normocephalic. Eyes:      Extraocular Movements: Extraocular movements intact. Pupils: Pupils are equal, round, and reactive to light. Cardiovascular:      Rate and Rhythm: Normal rate and regular rhythm. Pulmonary:      Effort: Pulmonary effort is normal.      Breath sounds: Normal breath sounds. No wheezing. Comments: clear  Musculoskeletal:      Cervical back: Neck supple. Neurological:      General: No focal deficit present. Mental Status: She is alert and oriented to person, place, and time.    Psychiatric:         Mood and Affect: Mood normal.         Behavior: Behavior normal.                An electronic signature was used to authenticate this note.     --Minh Bernard, APRARTI - CNP

## 2023-02-05 LAB
M TB IFN-G BLD-IMP: NEGATIVE
M TB IFN-G CD4+ T-CELLS BLD-ACNC: 0.1 IU/ML
M TBIFN-G CD4+ CD8+T-CELLS BLD-ACNC: 0.08 IU/ML
QUANTIFERON CRITERIA: NORMAL
QUANTIFERON MITOGEN VALUE: >10 IU/ML
QUANTIFERON NIL VALUE: 0.11 IU/ML
QUANTIFERON, INCUBATION: NORMAL

## 2023-02-06 ENCOUNTER — OFFICE VISIT (OUTPATIENT)
Dept: INTERNAL MEDICINE CLINIC | Facility: CLINIC | Age: 63
End: 2023-02-06
Payer: COMMERCIAL

## 2023-02-06 VITALS
WEIGHT: 108 LBS | HEIGHT: 64 IN | OXYGEN SATURATION: 99 % | SYSTOLIC BLOOD PRESSURE: 110 MMHG | HEART RATE: 64 BPM | BODY MASS INDEX: 18.44 KG/M2 | DIASTOLIC BLOOD PRESSURE: 80 MMHG

## 2023-02-06 DIAGNOSIS — J47.9 BRONCHIECTASIS WITHOUT COMPLICATION (HCC): ICD-10-CM

## 2023-02-06 DIAGNOSIS — R04.2 COUGH WITH HEMOPTYSIS: Primary | ICD-10-CM

## 2023-02-06 DIAGNOSIS — R91.1 PULMONARY NODULE: ICD-10-CM

## 2023-02-06 PROCEDURE — 99213 OFFICE O/P EST LOW 20 MIN: CPT | Performed by: NURSE PRACTITIONER

## 2023-02-06 PROCEDURE — 96372 THER/PROPH/DIAG INJ SC/IM: CPT | Performed by: NURSE PRACTITIONER

## 2023-02-06 RX ADMIN — CYANOCOBALAMIN 1000 MCG: 1000 INJECTION, SOLUTION INTRAMUSCULAR; SUBCUTANEOUS at 08:28

## 2023-02-06 SDOH — ECONOMIC STABILITY: FOOD INSECURITY: WITHIN THE PAST 12 MONTHS, YOU WORRIED THAT YOUR FOOD WOULD RUN OUT BEFORE YOU GOT MONEY TO BUY MORE.: NEVER TRUE

## 2023-02-06 SDOH — ECONOMIC STABILITY: INCOME INSECURITY: HOW HARD IS IT FOR YOU TO PAY FOR THE VERY BASICS LIKE FOOD, HOUSING, MEDICAL CARE, AND HEATING?: NOT VERY HARD

## 2023-02-06 SDOH — ECONOMIC STABILITY: HOUSING INSECURITY
IN THE LAST 12 MONTHS, WAS THERE A TIME WHEN YOU DID NOT HAVE A STEADY PLACE TO SLEEP OR SLEPT IN A SHELTER (INCLUDING NOW)?: NO

## 2023-02-06 SDOH — ECONOMIC STABILITY: FOOD INSECURITY: WITHIN THE PAST 12 MONTHS, THE FOOD YOU BOUGHT JUST DIDN'T LAST AND YOU DIDN'T HAVE MONEY TO GET MORE.: NEVER TRUE

## 2023-02-06 ASSESSMENT — PATIENT HEALTH QUESTIONNAIRE - PHQ9
SUM OF ALL RESPONSES TO PHQ9 QUESTIONS 1 & 2: 0
1. LITTLE INTEREST OR PLEASURE IN DOING THINGS: 0
SUM OF ALL RESPONSES TO PHQ QUESTIONS 1-9: 0
2. FEELING DOWN, DEPRESSED OR HOPELESS: 0

## 2023-02-06 NOTE — PROGRESS NOTES
Mohini Goodwin (:  1960) is a 60 y.o. female,Established patient, here for evaluation of the following chief complaint(s):  Referral - General         ASSESSMENT/PLAN:  1. Cough with hemoptysis  -     [facility]monary [facility] Critical Care  2. Pulmonary nodule  -     [facility] Pulmonary [facility] Critical Care  3. Bronchiectasis without complication ([facility] [address]      No follow-ups on file. Patient desiring new referral  Referral to [facility]  Lungs clear, no futher hemoptysis or cough, negative quantiferon  F/u with PCP or as needed  Subjective   SUBJECTIVE/OBJECTIVE:  Patient is here for follow up of cough. She has had no further episode of hemoptysis. She has no cough. She would like a referral to [facility]. Review of Systems       Objective   Physical Exam  Vitals reviewed. Constitutional:       Appearance: Normal appearance. She is not ill-appearing. HENT:      Head: Normocephalic. Right Ear: External ear normal.      Left Ear: External ear normal.   Eyes:      Extraocular Movements: Extraocular movements intact. Pupils: Pupils are equal, round, and reactive to light. Cardiovascular:      Rate and Rhythm: Normal rate and regular rhythm. Pulmonary:      Effort: Pulmonary effort is normal.      Breath sounds: Normal breath sounds. No rhonchi. Musculoskeletal:      Cervical back: Neck supple. Skin:     General: Skin is warm and dry. Neurological:      General: No focal deficit present. Mental Status: She is alert and oriented to person, place, and time. Psychiatric:         Behavior: Behavior normal.                An electronic signature was used to authenticate this note.     --VILLA Gaviria - CNP

## 2023-02-07 ENCOUNTER — TELEPHONE (OUTPATIENT)
Dept: PULMONOLOGY | Age: 63
End: 2023-02-07

## 2023-02-07 NOTE — TELEPHONE ENCOUNTER
Note    Urgent referral sent by Austin Goyal for cough with hemoptysis, \"known\" pulmonary nodule. No recent imaging. CT ordered but not completed. Patient saw Maye Fitzpatrick 2/6/2023-hemoptysis, pulmonary nodule, bronchiectasis   Patient desiring new referral  Referral to [place] [facility]  Lungs clear, no futher hemoptysis or cough, negative quantiferon  F/u with PCP or as needed    I have spoken with patient. CT chest done at [address] on 1/19/2023. CONCLUSION: Necrotic cystic lesion right upper lobe with adjacent  bronchiectasis and bronchiolitis. Consider fungal or granulomatous diseases. Less likely malignancy however pulmonary consultation and close follow-up  recommended. Pulmonary nodule measuring 9.6 mm right upper lobe. See below for follow-up  guidance. Patchy scarring or atelectasis left lung. Biapical scarring. Low density nodules measuring under centimeter in the liver likely benign. Per patient she coughed up blood only 1 time last month on 1/30 and none since. No reported blood thinners. I have faxed request to [facility] to push 2/21/18, 2/17/17, 10/15/16 and 10/9/16 to PACS. Offered appointment with Mrs Stephanie Trevino on 2/9 for further discussion. She will arrive at 0910. No reported recent COVID, flu exposure. Amirah Prajapati

## 2023-02-08 ENCOUNTER — CLINICAL DOCUMENTATION (OUTPATIENT)
Dept: PULMONOLOGY | Age: 63
End: 2023-02-08

## 2023-02-08 NOTE — PROGRESS NOTES
Name:  Nicol Navarro  YOB: 1960   MRN: [id number]      Office Visit: 2/9/2023        ASSESSMENT AND PLAN:  (Medical Decision Making)    Impression: Pt is a 59 yo female seen today for abnormal chest CT with concern for pulmonary abscess. 1. Pulmonary nodule  Pt will start antibiotics for 6 weeks. We will then repeat CT and if area resolved or improved, no further work up. If the abscess area doesn't improve, she will require bronchoscopy and biopsy. Pt is agreeable to the plan. - Spirometry Without Bronchodilator; Future  - Spirometry Without Bronchodilator  - CT CHEST WO CONTRAST; Future    2. Abscess of upper lobe of right lung without pneumonia ([address]  Pt will take augmentin DS twice daily for 6 weeks. She will also take probiotics to help prevent diarrhea issues. If she has any issues with the medication, she will need to change to clindamycin. Pt is unable to cough up any phlegm at this time so unable to check AFB/sputum cx.   - CT CHEST WO CONTRAST; Future    3. Mild intermittent asthma without complication  Continue breo daily. She is doing well and spirometry is normal.     4. Abnormal chest CT  Repeat chest CT in 6 weeks. 5. Hemoptysis  Only one occurrence, no further issues since that time. We will continue to monitor. Orders Placed This Encounter   Medications    Probiotic Acidophilus (FLORANEX) TABS     Sig: Take 1 tablet by mouth 2 times daily     Dispense:  60 tablet     Refill:  1    amoxicillin-clavulanate (AUGMENTIN) 875-125 MG per tablet     Sig: Take 1 tablet by mouth 2 times daily For 6 weeks     Dispense:  84 tablet     Refill:  0     No orders of the defined types were placed in this encounter. Follow-up and Dispositions    Return for 8 weeks with me -40 mins please . Kumar Shaanable, PA    No specialty comments available. Total time for encounter on day of encounter was 48 minutes.   This time includes chart prep, review of tests/procedures, review of other provider's notes, documentation and counseling patient regarding disease process and medications. _________________________________________________________________________    HISTORY OF PRESENT ILLNESS:    Ms. Rosanne Shah is a 60 y.o. female who is seen at [facility] today for new patient evaluation. Pt had an episode of hemoptysis and cough x 1 several weeks ago. She was referred urgently to our office for further evaluation. Pt has an extensive history and all of this was reviewed prior to her appt today. Pt was hospitalized 10/9-10/21/16 after a fall in the shower resulting in mulitple rib fractures, small L pleural effusion, and pt was found to have hemothorax. She was seen by thoracic surgery and underwent VATS with evacuation of hemothorax and cryoablation of intercostal nerves III-XI on 10/17/16. She had repeat chest CT 2/17/17 with small indeterminate liver lesions and 1 area of scarring/lung nodule in the LLL. She had repeat CT 2/21/18 with increased mucoid impaction injury and nodularity in RUL, RML, and GIUSEPPE felt to be inflammatory. She was then referred to Dr. Rosalio Flores at [facility]. She was felt to have moderate persistent asthma in the setting of bronchiectasis. AFB culture was negative at that time. Pt reports that she has been doing fine overall. She had a chest CT 1/19/23 at [address] which revealed a 4cm x 2.7 cm x2.5cm RUL opacity with an air fluid level. There is also adjacent bronchiectasis with reticular nodular disease and GGO in the RUL. She also had a 5.5 x 9.6 x 5.3 mm nodule in the minor fissure of the RUL. There was also bilateral apical scarring. These scans were reviewed with Dr. Anish Cha. There is concern that the RUL area could be a lung abscess. I discussed this with the patient in detail. Pt is agreeable but hesitant to take 6 weeks of PO abx. She reports that she developed a rash under her arms many years ago after taking PCN for a dental procedure.  She is agreeable to try augmentin but will let us know if she has any issues. Pt reports that she has lost about 12 lbs. She has been watching her food intake because of elevated cholesterol issues. She has cut out junk food so she thinks that this is why she has lost weight. She denies night sweats, fever, chills, or any further hemoptysis issues. She reports that she was lying down resting and then felt like she had to clear her throat. She then got up and thought she had a mouth full of mucous. She spit it into the toilet and it was all blood. She has not any other episodes since then. She does cough but really clears her throat more than anything. She attributes this to postnasal drip. She doesn't cough up any phlegm. She is on breo daily for asthma. She had albuterol hfa but it caused some anxiety and she stopped it. The breo really helps with her asthma. Pt is a lifelong nonsmoker. She lives alone. She worked in a [employer] for 46 years. She did not wear a mask. She did wear safety goggles and ear plugs. 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 REVIEW OF SYSTEMS: 10 point review of systems is negative except as reported in HPI. PHYSICAL EXAM: Body mass index is 18.37 kg/m². Vitals:    02/09/23 0922   BP: (!) 157/94   Pulse: 68   Resp: 14   Temp: 97 °F (36.1 °C)   SpO2: 100%   Weight: 107 lb (48.5 kg)   Height: 5' 4\" (1.626 m)         General:   Alert, cooperative, no distress, appears stated age, thin, on RA. Eyes:   Conjunctivae/corneas clear. PERRL        Mouth/Throat:  Lips, mucosa, and tongue normal. Teeth and gums normal.        Lungs:     fairly clear, no wheezing     Heart:   Regular rate and rhythm, S1, S2 normal, no murmur, click, rub or gallop. Abdomen:    Soft, non-tender. Extremities:  Extremities normal, atraumatic, no cyanosis or edema.      Skin:  Skin color normal. No rashes or lesions     Neurologic:  A&Ox3     DIAGNOSTIC TESTS: LABS:   Lab Results   Component Value Date/Time    WBC 8.7 01/16/2023 11:08 AM    HGB 12.7 01/16/2023 11:08 AM    HCT 40.2 01/16/2023 11:08 AM     01/16/2023 11:08 AM    TSH 2.520 12/17/2020 02:20 PM     Imaging: I performed an independent interpretation of the patient's images. CXR:   XR CHEST STANDARD TWO VW     CT Chest:   9/9/15    2018      2017    1/19/23        CT CHEST W CONTRAST     Nuclear Medicine: No results found for this or any previous visit from the past 3650 days. PFTs:       No flowsheet data found. No results found for this or any previous visit. No results found for this or any previous visit. FeNO: No results found for this or any previous visit. FeNO and Likelihood of Eosinophilic Asthma   Unlikely Intermediate Likely   <25 ppb 25-50 ppb >50ppb   Exercise Oximetry:  Echo: No results found for this or any previous visit from the past 3650 days.     Past Medical History:   Diagnosis Date    Anemia     Anxiety disorder     B12 deficiency     Bronchiectasis without complication ([facility] 39/5/4773    Depression     Gastroesophageal reflux     Hashimoto's disease 8/16/2017    Irritable bowel syndrome     Irritable bowel syndrome with diarrhea     Menopause     50s    Mild intermittent asthma without complication 86/7/8565    Osteopenia         Tobacco Use      Smoking status: Never      Smokeless tobacco: Never    Allergies   Allergen Reactions    Penicillins Other (See Comments) and Rash    Aspirin Other (See Comments)     Makes her \"heart beat too fast\"    Bupropion Other (See Comments)     Tremors, tachycardia    Escitalopram Other (See Comments)    Hydroxyzine Hcl Other (See Comments)    Levofloxacin Other (See Comments)    Loratadine Other (See Comments)     sleepy    Methylprednisolone Acetate Other (See Comments)     Light headed/anxious    Pantoprazole Hives    Sertraline Other (See Comments)     Felt spacy, but will be willing to try again    Sulfa Antibiotics Other (See Comments)    Acetaminophen Other (See Comments)     Was told not to take due to \"abnormal liver blood work\"    Sulfamethoxazole-Trimethoprim Rash     Current Outpatient Medications   Medication Instructions    amoxicillin-clavulanate (AUGMENTIN) 875-125 MG per tablet 1 tablet, Oral, 2 TIMES DAILY, For 6 weeks    calcium carbonate (OSCAL) 500 mg, Oral, DAILY    Cholecalciferol 50 MCG (2000 UT) TABS Oral    cyanocobalamin 1,000 mcg, IntraMUSCular, EVERY 30 DAYS    estradiol (ESTRACE) 2 g, TWICE WEEKLY    famotidine (PEPCID) 20 mg, Oral, 2 TIMES DAILY PRN    fexofenadine (ALLEGRA) 180 MG tablet Oral, DAILY    fluticasone (FLONASE) 50 MCG/ACT nasal spray 2 sprays, Nasal, DAILY, Two puffs in each nosril at bedtime Indications: inflammation of the nose due to an allergy    Fluticasone Furoate-Vilanterol (BREO ELLIPTA) 100-25 MCG/ACT AEPB 1 puff, Inhalation, DAILY    ibuprofen (ADVIL;MOTRIN) 100 mg, Oral, EVERY 6 HOURS PRN    LORazepam (ATIVAN) 0.5 mg, Oral, DAILY PRN    Probiotic Acidophilus (FLORANEX) TABS 1 tablet, Oral, 2 TIMES DAILY

## 2023-02-09 ENCOUNTER — OFFICE VISIT (OUTPATIENT)
Dept: PULMONOLOGY | Age: 63
End: 2023-02-09
Payer: COMMERCIAL

## 2023-02-09 VITALS
HEART RATE: 68 BPM | RESPIRATION RATE: 14 BRPM | HEIGHT: 64 IN | WEIGHT: 107 LBS | BODY MASS INDEX: 18.27 KG/M2 | OXYGEN SATURATION: 100 % | SYSTOLIC BLOOD PRESSURE: 157 MMHG | TEMPERATURE: 97 F | DIASTOLIC BLOOD PRESSURE: 94 MMHG

## 2023-02-09 DIAGNOSIS — R91.1 PULMONARY NODULE: Primary | ICD-10-CM

## 2023-02-09 DIAGNOSIS — J85.2 ABSCESS OF UPPER LOBE OF RIGHT LUNG WITHOUT PNEUMONIA (HCC): ICD-10-CM

## 2023-02-09 DIAGNOSIS — J45.20 MILD INTERMITTENT ASTHMA WITHOUT COMPLICATION: ICD-10-CM

## 2023-02-09 DIAGNOSIS — R04.2 HEMOPTYSIS: ICD-10-CM

## 2023-02-09 DIAGNOSIS — R93.89 ABNORMAL CHEST CT: ICD-10-CM

## 2023-02-09 LAB
EXPIRATORY TIME: NORMAL
FEF 25-75% %PRED-PRE: NORMAL
FEF 25-75% PRED: NORMAL
FEF 25-75%-PRE: NORMAL
FEV1 %PRED-PRE: NORMAL
FEV1 PRED: NORMAL
FEV1/FVC %PRED-PRE: NORMAL
FEV1/FVC PRED: NORMAL
FEV1/FVC: NORMAL
FEV1: NORMAL
FVC %PRED-PRE: NORMAL
FVC PRED: NORMAL
FVC: NORMAL
PEF %PRED-PRE: NORMAL
PEF PRED: NORMAL
PEF-PRE: NORMAL

## 2023-02-09 PROCEDURE — 94010 BREATHING CAPACITY TEST: CPT | Performed by: PHYSICIAN ASSISTANT

## 2023-02-09 PROCEDURE — 99204 OFFICE O/P NEW MOD 45 MIN: CPT | Performed by: PHYSICIAN ASSISTANT

## 2023-02-09 RX ORDER — AMOXICILLIN AND CLAVULANATE POTASSIUM 875; 125 MG/1; MG/1
1 TABLET, FILM COATED ORAL 2 TIMES DAILY
Qty: 84 TABLET | Refills: 0 | Status: SHIPPED | OUTPATIENT
Start: 2023-02-09 | End: 2023-03-23

## 2023-02-09 RX ORDER — GREEN TEA/HOODIA GORDONII 315-12.5MG
1 CAPSULE ORAL 2 TIMES DAILY
Qty: 60 TABLET | Refills: 1 | Status: SHIPPED | OUTPATIENT
Start: 2023-02-09 | End: 2023-03-11

## 2023-02-09 RX ORDER — CALCIUM CARBONATE 500(1250)
500 TABLET ORAL DAILY
COMMUNITY

## 2023-02-09 NOTE — PATIENT INSTRUCTIONS
You will start taking augmentin (antibiotic) two times daily for the next 6 weeks. You will also take probiotics twice daily. You will then have a chest CT scan at [facility] to follow up on the spot in your lung we are concerned about. [facility] should call you to schedule this CT scan. If you have any issues with the medications, please call [phone].

## 2023-03-02 ENCOUNTER — OFFICE VISIT (OUTPATIENT)
Dept: PULMONOLOGY | Age: 63
End: 2023-03-02
Payer: COMMERCIAL

## 2023-03-02 ENCOUNTER — TELEPHONE (OUTPATIENT)
Dept: PULMONOLOGY | Age: 63
End: 2023-03-02

## 2023-03-02 ENCOUNTER — HOSPITAL ENCOUNTER (OUTPATIENT)
Dept: GENERAL RADIOLOGY | Age: 63
Discharge: HOME | End: 2023-03-02
Payer: COMMERCIAL

## 2023-03-02 VITALS
HEART RATE: 68 BPM | HEIGHT: 63 IN | DIASTOLIC BLOOD PRESSURE: 88 MMHG | TEMPERATURE: 98 F | SYSTOLIC BLOOD PRESSURE: 158 MMHG | RESPIRATION RATE: 14 BRPM | WEIGHT: 110 LBS | OXYGEN SATURATION: 100 % | BODY MASS INDEX: 19.49 KG/M2

## 2023-03-02 DIAGNOSIS — R04.2 HEMOPTYSIS: Primary | ICD-10-CM

## 2023-03-02 DIAGNOSIS — R04.2 HEMOPTYSIS: ICD-10-CM

## 2023-03-02 DIAGNOSIS — J85.2 ABSCESS OF LUNG WITHOUT PNEUMONIA, UNSPECIFIED LATERALITY (HCC): ICD-10-CM

## 2023-03-02 DIAGNOSIS — J45.909 UNCOMPLICATED ASTHMA, UNSPECIFIED ASTHMA SEVERITY, UNSPECIFIED WHETHER PERSISTENT: ICD-10-CM

## 2023-03-02 PROCEDURE — 99214 OFFICE O/P EST MOD 30 MIN: CPT | Performed by: INTERNAL MEDICINE

## 2023-03-02 PROCEDURE — 71046 X-RAY EXAM CHEST 2 VIEWS: CPT

## 2023-03-02 NOTE — PROGRESS NOTES
[address] [place]. [address] [facility], [address]  [phone]      Patient Name:  Sujey Varela  YOB: 1960      Office Visit 3/3/2023    CHIEF COMPLAINT:    Chief Complaint   Patient presents with    Breathing Problem     hemoptysis         HISTORY OF PRESENT ILLNESS:       Patient  is 60years old white female who is here today is working for hemoptysis. She was recently seen in our office as a new patient for abnormal CT scan  and hemoptysis on February 9 . She was found to have right upper lobe 4 x 2.7 cm opacity with air-fluid level. It was decided that she will be treated with antibiotics for 6 weeks with follow-up CT scan. Today she came in and she reported that last night she had episode of an hemoptysis. It was very minimal and she had only spit it up may be spoon of blood. It only happened twice. First time before she had CT scan and before she came to our office and this was second time. She continues to take antibiotics Augmentin without any complaints. She does have good appetite she is actually gaining some weight. She denies shortness of breath.     Past Medical History:   Diagnosis Date    Anemia     Anxiety disorder     B12 deficiency     Bronchiectasis without complication ([address] 99/7/2598    Depression     Gastroesophageal reflux     Hashimoto's disease 8/16/2017    Irritable bowel syndrome     Irritable bowel syndrome with diarrhea     Menopause     50s    Mild intermittent asthma without complication 94/1/9258    Osteopenia          Patient Active Problem List   Diagnosis    Gastroesophageal reflux    Pulmonary nodule    Anxiety disorder    B12 deficiency    Mild intermittent asthma without complication    Mixed hyperlipidemia    Dyspareunia in female    Osteopenia    Hashimoto's disease    Bronchiectasis without complication (HCC)    Irritable bowel syndrome with diarrhea           Past Surgical History:   Procedure Laterality Date    APPENDECTOMY CHOLECYSTECTOMY      ORTHOPEDIC SURGERY Left 2007    hip    TOTAL HIP ARTHROPLASTY Left            Social History     Socioeconomic History    Marital status: Single     Spouse name: Not on file    Number of children: Not on file    Years of education: Not on file    Highest education level: Not on file   Occupational History    Not on file   Tobacco Use    Smoking status: Never    Smokeless tobacco: Never   Vaping Use    Vaping Use: Never used   Substance and Sexual Activity    Alcohol use: No    Drug use: No    Sexual activity: Not on file   Other Topics Concern    Not on file   Social History Narrative    Abuse: Feels safe at home, no history of physical abuse, no history of sexual abuse       Social Determinants of Health     Financial Resource Strain: Low Risk     Difficulty of Paying Living Expenses: Not very hard   Food Insecurity: No Food Insecurity    Worried About Running Out of Food in the Last Year: Never true    Ran Out of Food in the Last Year: Never true   Transportation Needs: Unknown    Lack of Transportation (Medical): Not on file    Lack of Transportation (Non-Medical):  No   Physical Activity: Not on file   Stress: Not on file   Social Connections: Not on file   Intimate Partner Violence: Not on file   Housing Stability: Unknown    Unable to Pay for Housing in the Last Year: Not on file    Number of Places Lived in the Last Year: Not on file    Unstable Housing in the Last Year: No         Family History   Problem Relation Age of Onset    Breast Cancer Maternal Aunt     Diabetes Mother     Uterine Cancer Neg Hx     Ovarian Cancer Neg Hx     Colon Cancer Neg Hx          Allergies   Allergen Reactions    Penicillins Other (See Comments) and Rash    Aspirin Other (See Comments)     Makes her \"heart beat too fast\"    Bupropion Other (See Comments)     Tremors, tachycardia    Escitalopram Other (See Comments)    Hydroxyzine Hcl Other (See Comments)    Levofloxacin Other (See Comments)    Loratadine Other (See Comments)     sleepy    Methylprednisolone Acetate Other (See Comments)     Light headed/anxious    Pantoprazole Hives    Sertraline Other (See Comments)     Felt spacy, but will be willing to try again    Sulfa Antibiotics Other (See Comments)    Acetaminophen Other (See Comments)     Was told not to take due to \"abnormal liver blood work\"    Sulfamethoxazole-Trimethoprim Rash         Current Outpatient Medications   Medication Sig    calcium carbonate (OSCAL) 500 MG TABS tablet Take 500 mg by mouth daily    Probiotic Acidophilus (FLORANEX) TABS Take 1 tablet by mouth 2 times daily    amoxicillin-clavulanate (AUGMENTIN) 875-125 MG per tablet Take 1 tablet by mouth 2 times daily For 6 weeks    famotidine (PEPCID) 20 MG tablet Take 1 tablet by mouth 2 times daily as needed (Indigestion)    Fluticasone Furoate-Vilanterol (BREO ELLIPTA) 100-25 MCG/ACT AEPB Inhale 1 puff into the lungs daily    fluticasone (FLONASE) 50 MCG/ACT nasal spray 2 sprays by Nasal route daily Two puffs in each nosril at bedtime Indications: inflammation of the nose due to an allergy    Cholecalciferol 50 MCG (2000 UT) TABS Take by mouth    cyanocobalamin 1000 MCG/ML injection Inject 1,000 mcg into the muscle every 30 days    estradiol (ESTRACE) 0.1 MG/GM vaginal cream Place 2 g vaginally Twice a Week    fexofenadine (ALLEGRA) 180 MG tablet Take by mouth daily    ibuprofen (ADVIL;MOTRIN) 100 MG tablet Take 100 mg by mouth every 6 hours as needed     Current Facility-Administered Medications   Medication Dose Route Frequency    cyanocobalamin injection 1,000 mcg  1,000 mcg IntraMUSCular Q30 Days           REVIEW OF SYSTEMS:  Review of Systems   All other systems reviewed and are negative. PHYSICAL EXAM:    Vitals:    03/02/23 1517   BP: (!) 158/88   Pulse: 68   Resp: 14   Temp: 98 °F (36.7 °C)   SpO2: 100%        GENERAL APPEARANCE:   The patient is normal weight and in no respiratory distress. HEENT:   PERRL.   Conjunctivae unremarkable. Nasal mucosa is without epistaxis, exudate, or polyps. Gums and dentition are unremarkable. There is no oropharyngeal narrowing. TMs are clear. NECK/LYMPHATIC:   Symmetrical with no elevation of jugular venous pulsation. Trachea midline. No thyroid enlargement. No cervical adenopathy. LUNGS:   Normal respiratory effort with symmetrical lung expansion. Breath sounds clear. HEART:   There is a regular rate and rhythm. No murmur, rub, or gallop. There is no edema in the lower extremities. ABDOMEN:   Soft and non-tender. No hepatosplenomegaly. Bowel sounds are normal.     NEURO:   The patient is alert and oriented to person, place, and time. Memory appears intact and mood is normal.  No gross sensorimotor deficits are present. DIAGNOSTIC TESTS:   PCXR: No valid procedures specified. CXR PA and lateral:  No results found for this or any previous visit. Screening chest CT: No results found for this or any previous visit. CT of chest without contrast:   No results found for this or any previous visit. CT of chest with contrast:  No valid procedures specified. PET/CT: No valid procedures specified. Spirometry:    No flowsheet data found. ASSESSMENT:  (Medical Decision Making)      Diagnosis Orders   1. Hemoptysis    Patient had only 1 episode of minimal hemoptysis. She has been treated now for 3 weeks with Augmentin. She is scheduled for CT scan 3/27. Patient is very concerned about the lesion. X-ray today done which showed stable right upper lobe lesion. It is hard to compare it to the CT scan therefore I told her that she needs to call [facility] and reschedule her CT scan for sometimes next week which would make it 4 weeks. This should be enough time if it is abscess to have improvement in the lesion. If there will be any change or if it will not be improved then she will need to be set up for bronchoscopy.   That was discussed with her lungs.      2. Abscess of lung without pneumonia, unspecified laterality ([facility]        3. Uncomplicated asthma, unspecified asthma severity, unspecified whether persistent             PLAN:    -She will call to have her CT scan done little bit sooner sometimes at the end of next week which would make it 4 weeks and then she will come back the following week to discuss the results and further plan. She will continue to take Augmentin in the meanwhile.  -She can call us earlier if she develops more hemoptysis. No orders of the defined types were placed in this encounter. No orders of the defined types were placed in this encounter. Over 50% of today's office visit was spent in face to face time reviewing test results/records, prognosis, importance of compliance, education about disease process, benefits of medications, instructions for management of acute flare-ups, and follow up plans. Total time spent was 30 minutes. Sondra Castro MD  Electronically signed    Dictated using voice recognition software.   Proof read but unrecognized errors may exist.

## 2023-03-02 NOTE — TELEPHONE ENCOUNTER
Last seen: 2/9/22 new  Hx: nodule, abscess of R ULL, asthma, hemoptysis    Planned f/u in 8 wks post CT @ [facility]. No record of CT being done yet. Patient reporting hemoptysis during the night and asking for appt. Contacted patient to assess extent of hemoptysis, no a lot of coughing, was like she needed to clear her throat, lightly cleared throat then cough and blood came up and she spit in a napkin; \"just happened all of sudden\"; unable to assess amount; some nasal dryness, having some PND; worked in for office visit w/ CXR prior this afternoon. Notes she hasn't had CT scan done since she hadn't finished antibiotics.

## 2023-03-02 NOTE — H&P (VIEW-ONLY)
[address] [facility]. [address], [address] [facility]  [phone]      Patient Name:  Vasquez Mina  YOB: 1960      Office Visit 3/3/2023    CHIEF COMPLAINT:    Chief Complaint   Patient presents with    Breathing Problem     hemoptysis         HISTORY OF PRESENT ILLNESS:       Patient  is 60years old white female who is here today is working for hemoptysis. She was recently seen in our office as a new patient for abnormal CT scan  and hemoptysis on February 9 . She was found to have right upper lobe 4 x 2.7 cm opacity with air-fluid level. It was decided that she will be treated with antibiotics for 6 weeks with follow-up CT scan. Today she came in and she reported that last night she had episode of an hemoptysis. It was very minimal and she had only spit it up may be spoon of blood. It only happened twice. First time before she had CT scan and before she came to our office and this was second time. She continues to take antibiotics Augmentin without any complaints. She does have good appetite she is actually gaining some weight. She denies shortness of breath.     Past Medical History:   Diagnosis Date    Anemia     Anxiety disorder     B12 deficiency     Bronchiectasis without complication ([address] 53/1/7489    Depression     Gastroesophageal reflux     Hashimoto's disease 8/16/2017    Irritable bowel syndrome     Irritable bowel syndrome with diarrhea     Menopause     50s    Mild intermittent asthma without complication 69/4/5136    Osteopenia          Patient Active Problem List   Diagnosis    Gastroesophageal reflux    Pulmonary nodule    Anxiety disorder    B12 deficiency    Mild intermittent asthma without complication    Mixed hyperlipidemia    Dyspareunia in female    Osteopenia    Hashimoto's disease    Bronchiectasis without complication (HCC)    Irritable bowel syndrome with diarrhea           Past Surgical History:   Procedure Laterality Date    APPENDECTOMY lungs.      2. Abscess of lung without pneumonia, unspecified laterality ([facility]        3. Uncomplicated asthma, unspecified asthma severity, unspecified whether persistent             PLAN:    -She will call to have her CT scan done little bit sooner sometimes at the end of next week which would make it 4 weeks and then she will come back the following week to discuss the results and further plan. She will continue to take Augmentin in the meanwhile.  -She can call us earlier if she develops more hemoptysis. No orders of the defined types were placed in this encounter. No orders of the defined types were placed in this encounter. Over 50% of today's office visit was spent in face to face time reviewing test results/records, prognosis, importance of compliance, education about disease process, benefits of medications, instructions for management of acute flare-ups, and follow up plans. Total time spent was 30 minutes. Sherlyn Geronimo MD  Electronically signed    Dictated using voice recognition software.   Proof read but unrecognized errors may exist.

## 2023-03-07 ENCOUNTER — NURSE ONLY (OUTPATIENT)
Dept: INTERNAL MEDICINE CLINIC | Facility: CLINIC | Age: 63
End: 2023-03-07
Payer: COMMERCIAL

## 2023-03-07 DIAGNOSIS — E53.8 B12 DEFICIENCY: Primary | ICD-10-CM

## 2023-03-07 PROCEDURE — 96372 THER/PROPH/DIAG INJ SC/IM: CPT | Performed by: NURSE PRACTITIONER

## 2023-03-07 RX ADMIN — CYANOCOBALAMIN 1000 MCG: 1000 INJECTION, SOLUTION INTRAMUSCULAR; SUBCUTANEOUS at 08:58

## 2023-03-13 DIAGNOSIS — J85.2 ABSCESS OF UPPER LOBE OF RIGHT LUNG WITHOUT PNEUMONIA (HCC): ICD-10-CM

## 2023-03-13 DIAGNOSIS — R91.1 PULMONARY NODULE: ICD-10-CM

## 2023-03-14 ENCOUNTER — TELEPHONE (OUTPATIENT)
Dept: PULMONOLOGY | Age: 63
End: 2023-03-14

## 2023-03-14 DIAGNOSIS — R91.1 PULMONARY NODULE: Primary | ICD-10-CM

## 2023-03-14 NOTE — TELEPHONE ENCOUNTER
I reviewed pt's chest CT from [facility] with Dr. Albert Tucker. There has been no improvement despite 4 weeks of augmentin. She would like pt to proceed with bronch with biopsy. I called pt and left her a message to call back to discuss. Dr. Albert Tucker wants to cancel her appt 3/16 with Dr. Albert Tucker and to have pt scheduled for procedure. Can you help get this scheduled? Thank you!

## 2023-03-15 ENCOUNTER — PREP FOR PROCEDURE (OUTPATIENT)
Dept: PULMONOLOGY | Age: 63
End: 2023-03-15

## 2023-03-15 RX ORDER — SODIUM CHLORIDE 0.9 % (FLUSH) 0.9 %
5-40 SYRINGE (ML) INJECTION PRN
Status: CANCELLED | OUTPATIENT
Start: 2023-03-15

## 2023-03-15 RX ORDER — SODIUM CHLORIDE 0.9 % (FLUSH) 0.9 %
5-40 SYRINGE (ML) INJECTION EVERY 12 HOURS SCHEDULED
Status: CANCELLED | OUTPATIENT
Start: 2023-03-15

## 2023-03-15 RX ORDER — SODIUM CHLORIDE 9 MG/ML
INJECTION, SOLUTION INTRAVENOUS PRN
Status: CANCELLED | OUTPATIENT
Start: 2023-03-15

## 2023-03-15 RX ORDER — LORAZEPAM 1 MG/1
1 TABLET ORAL EVERY 6 HOURS PRN
COMMUNITY

## 2023-03-15 NOTE — PERIOP NOTE
Patient verified name and . Order for consent  found in EHR and matches case posting; patient verifies procedure. Type 1B surgery, phone assessment complete. Orders  received. Labs per surgeon: none  Labs per anesthesia protocol: none    Patient answered medical/surgical history questions at their best of ability. All prior to admission medications documented in EPIC. Patient instructed to take the following medications the day of surgery according to anesthesia guidelines with a small sip of water: Famotidine (Pepcid),  Lorazepam (Ativan) if needed, allegra, Flonase if needed On the day before surgery please take Acetaminophen 1000mg in the morning and then again before bed. You may substitute for Tylenol 650 mg. Hold all vitamins 7 days prior to surgery and NSAIDS 5 days prior to surgery. Prescription meds to hold:vitamins  Patient instructed on the following:    > Arrive at [facility], time of arrival to be called the day before by 1700  > NPO after midnight, unless otherwise indicated, including gum, mints, and ice chips  > Responsible adult must drive patient to the hospital, stay during surgery, and patient will need supervision 24 hours after anesthesia  > Use antibacterial soap in shower the night before surgery and on the morning of surgery  > All piercings must be removed prior to arrival.    > Leave all valuables (money and jewelry) at home but bring insurance card and ID on DOS.   > You may be required to pay a deductible or co-pay on the day of your procedure. You can pre-pay by calling [phone] if your surgery is at the [facility] or [phone] if your surgery is at the [facility]. > Do not wear make-up, nail polish, lotions, cologne, perfumes, powders, or oil on skin. Artificial nails are not permitted.

## 2023-03-15 NOTE — TELEPHONE ENCOUNTER
I have spoken with patient. She is aware that CT chest done on 3/9 has been reviewed by Dr Mitzy Gilliland and Dr Cecilia Murillo. She is aware that despite antibiotics, CT chest has not improved and recommendation is for PET scan and BX which I have explained. She is agreeable but wishes to keep appointment with Dr José Miguel Pelayo for further discussion tomorrow and in person instructions. PET scan is scheduled on 3/21 and BX is scheduled on 3/28. Instructions placed on AVS for tomorrow's appointment. I have faxed request to [phone] for thin cuts from 3/9/2023 CT chest be pushed to PACS for disc for BX.

## 2023-03-17 ENCOUNTER — TELEPHONE (OUTPATIENT)
Dept: PULMONOLOGY | Age: 63
End: 2023-03-17

## 2023-03-27 ENCOUNTER — ANESTHESIA EVENT (OUTPATIENT)
Dept: ENDOSCOPY | Age: 63
End: 2023-03-27
Payer: COMMERCIAL

## 2023-03-27 NOTE — PROGRESS NOTES
RN called patient to confirm 0800 procedure time with 0630 arrival time,  policy, and location ([address] Patient verbalized understanding.

## 2023-03-28 ENCOUNTER — APPOINTMENT (OUTPATIENT)
Dept: GENERAL RADIOLOGY | Age: 63
End: 2023-03-28
Attending: INTERNAL MEDICINE
Payer: COMMERCIAL

## 2023-03-28 ENCOUNTER — HOSPITAL ENCOUNTER (OUTPATIENT)
Age: 63
Setting detail: OUTPATIENT SURGERY
Discharge: HOME | End: 2023-03-28
Attending: INTERNAL MEDICINE | Admitting: INTERNAL MEDICINE
Payer: COMMERCIAL

## 2023-03-28 ENCOUNTER — ANESTHESIA (OUTPATIENT)
Dept: ENDOSCOPY | Age: 63
End: 2023-03-28
Payer: COMMERCIAL

## 2023-03-28 VITALS
RESPIRATION RATE: 15 BRPM | HEIGHT: 63 IN | DIASTOLIC BLOOD PRESSURE: 72 MMHG | OXYGEN SATURATION: 99 % | BODY MASS INDEX: 19.67 KG/M2 | TEMPERATURE: 97.9 F | HEART RATE: 68 BPM | SYSTOLIC BLOOD PRESSURE: 131 MMHG | WEIGHT: 111 LBS

## 2023-03-28 PROBLEM — R91.8 LUNG MASS: Status: ACTIVE | Noted: 2023-03-28

## 2023-03-28 PROCEDURE — 87116 MYCOBACTERIA CULTURE: CPT

## 2023-03-28 PROCEDURE — 87070 CULTURE OTHR SPECIMN AEROBIC: CPT

## 2023-03-28 PROCEDURE — 7100000011 HC PHASE II RECOVERY - ADDTL 15 MIN: Performed by: INTERNAL MEDICINE

## 2023-03-28 PROCEDURE — 7100000010 HC PHASE II RECOVERY - FIRST 15 MIN: Performed by: INTERNAL MEDICINE

## 2023-03-28 PROCEDURE — 88112 CYTOPATH CELL ENHANCE TECH: CPT

## 2023-03-28 PROCEDURE — 88333 PATH CONSLTJ SURG CYTO XM 1: CPT

## 2023-03-28 PROCEDURE — 88172 CYTP DX EVAL FNA 1ST EA SITE: CPT

## 2023-03-28 PROCEDURE — 2720000010 HC SURG SUPPLY STERILE: Performed by: INTERNAL MEDICINE

## 2023-03-28 PROCEDURE — 31652 BRONCH EBUS SAMPLNG 1/2 NODE: CPT | Performed by: INTERNAL MEDICINE

## 2023-03-28 PROCEDURE — 2709999900 HC NON-CHARGEABLE SUPPLY: Performed by: INTERNAL MEDICINE

## 2023-03-28 PROCEDURE — 2500000003 HC RX 250 WO HCPCS: Performed by: REGISTERED NURSE

## 2023-03-28 PROCEDURE — 6360000002 HC RX W HCPCS: Performed by: REGISTERED NURSE

## 2023-03-28 PROCEDURE — 31629 BRONCHOSCOPY/NEEDLE BX EACH: CPT | Performed by: INTERNAL MEDICINE

## 2023-03-28 PROCEDURE — 2580000003 HC RX 258: Performed by: REGISTERED NURSE

## 2023-03-28 PROCEDURE — 31627 NAVIGATIONAL BRONCHOSCOPY: CPT | Performed by: INTERNAL MEDICINE

## 2023-03-28 PROCEDURE — 88334 PATH CONSLTJ SURG CYTO XM EA: CPT

## 2023-03-28 PROCEDURE — 7100000001 HC PACU RECOVERY - ADDTL 15 MIN: Performed by: INTERNAL MEDICINE

## 2023-03-28 PROCEDURE — 88177 CYTP FNA EVAL EA ADDL: CPT

## 2023-03-28 PROCEDURE — 31628 BRONCHOSCOPY/LUNG BX EACH: CPT | Performed by: INTERNAL MEDICINE

## 2023-03-28 PROCEDURE — 2580000003 HC RX 258: Performed by: ANESTHESIOLOGY

## 2023-03-28 PROCEDURE — 3609020000 HC BRONCHOSCOPY W/EBUS FNA: Performed by: INTERNAL MEDICINE

## 2023-03-28 PROCEDURE — 7100000000 HC PACU RECOVERY - FIRST 15 MIN: Performed by: INTERNAL MEDICINE

## 2023-03-28 PROCEDURE — 6360000002 HC RX W HCPCS: Performed by: ANESTHESIOLOGY

## 2023-03-28 PROCEDURE — 31654 BRONCH EBUS IVNTJ PERPH LES: CPT | Performed by: INTERNAL MEDICINE

## 2023-03-28 PROCEDURE — 88305 TISSUE EXAM BY PATHOLOGIST: CPT

## 2023-03-28 PROCEDURE — 88173 CYTOPATH EVAL FNA REPORT: CPT

## 2023-03-28 PROCEDURE — 3700000001 HC ADD 15 MINUTES (ANESTHESIA): Performed by: INTERNAL MEDICINE

## 2023-03-28 PROCEDURE — 87102 FUNGUS ISOLATION CULTURE: CPT

## 2023-03-28 PROCEDURE — 31624 DX BRONCHOSCOPE/LAVAGE: CPT | Performed by: INTERNAL MEDICINE

## 2023-03-28 PROCEDURE — 3700000000 HC ANESTHESIA ATTENDED CARE: Performed by: INTERNAL MEDICINE

## 2023-03-28 PROCEDURE — 3609027000 HC BRONCHOSCOPY: Performed by: INTERNAL MEDICINE

## 2023-03-28 RX ORDER — LIDOCAINE HYDROCHLORIDE 10 MG/ML
1 INJECTION, SOLUTION INFILTRATION; PERINEURAL
Status: DISCONTINUED | OUTPATIENT
Start: 2023-03-28 | End: 2023-03-28 | Stop reason: HOSPADM

## 2023-03-28 RX ORDER — DEXAMETHASONE SODIUM PHOSPHATE 4 MG/ML
INJECTION, SOLUTION INTRA-ARTICULAR; INTRALESIONAL; INTRAMUSCULAR; INTRAVENOUS; SOFT TISSUE PRN
Status: DISCONTINUED | OUTPATIENT
Start: 2023-03-28 | End: 2023-03-28 | Stop reason: SDUPTHER

## 2023-03-28 RX ORDER — DEXTROSE MONOHYDRATE 100 MG/ML
INJECTION, SOLUTION INTRAVENOUS CONTINUOUS PRN
Status: DISCONTINUED | OUTPATIENT
Start: 2023-03-28 | End: 2023-03-28 | Stop reason: HOSPADM

## 2023-03-28 RX ORDER — HALOPERIDOL 5 MG/ML
1 INJECTION INTRAMUSCULAR
Status: DISCONTINUED | OUTPATIENT
Start: 2023-03-28 | End: 2023-03-28 | Stop reason: HOSPADM

## 2023-03-28 RX ORDER — ROCURONIUM BROMIDE 10 MG/ML
INJECTION, SOLUTION INTRAVENOUS PRN
Status: DISCONTINUED | OUTPATIENT
Start: 2023-03-28 | End: 2023-03-28 | Stop reason: SDUPTHER

## 2023-03-28 RX ORDER — EPHEDRINE SULFATE/0.9% NACL/PF 50 MG/5 ML
SYRINGE (ML) INTRAVENOUS PRN
Status: DISCONTINUED | OUTPATIENT
Start: 2023-03-28 | End: 2023-03-28 | Stop reason: SDUPTHER

## 2023-03-28 RX ORDER — SODIUM CHLORIDE 0.9 % (FLUSH) 0.9 %
5-40 SYRINGE (ML) INJECTION EVERY 12 HOURS SCHEDULED
Status: DISCONTINUED | OUTPATIENT
Start: 2023-03-28 | End: 2023-03-28 | Stop reason: HOSPADM

## 2023-03-28 RX ORDER — PROPOFOL 10 MG/ML
INJECTION, EMULSION INTRAVENOUS PRN
Status: DISCONTINUED | OUTPATIENT
Start: 2023-03-28 | End: 2023-03-28 | Stop reason: SDUPTHER

## 2023-03-28 RX ORDER — SODIUM CHLORIDE 9 MG/ML
INJECTION, SOLUTION INTRAVENOUS PRN
Status: DISCONTINUED | OUTPATIENT
Start: 2023-03-28 | End: 2023-03-28 | Stop reason: HOSPADM

## 2023-03-28 RX ORDER — SODIUM CHLORIDE 0.9 % (FLUSH) 0.9 %
5-40 SYRINGE (ML) INJECTION PRN
Status: DISCONTINUED | OUTPATIENT
Start: 2023-03-28 | End: 2023-03-28 | Stop reason: HOSPADM

## 2023-03-28 RX ORDER — LIDOCAINE HYDROCHLORIDE 20 MG/ML
INJECTION, SOLUTION EPIDURAL; INFILTRATION; INTRACAUDAL; PERINEURAL PRN
Status: DISCONTINUED | OUTPATIENT
Start: 2023-03-28 | End: 2023-03-28 | Stop reason: SDUPTHER

## 2023-03-28 RX ORDER — MIDAZOLAM HYDROCHLORIDE 2 MG/2ML
2 INJECTION, SOLUTION INTRAMUSCULAR; INTRAVENOUS
Status: DISCONTINUED | OUTPATIENT
Start: 2023-03-28 | End: 2023-03-28 | Stop reason: HOSPADM

## 2023-03-28 RX ORDER — SODIUM CHLORIDE, SODIUM LACTATE, POTASSIUM CHLORIDE, CALCIUM CHLORIDE 600; 310; 30; 20 MG/100ML; MG/100ML; MG/100ML; MG/100ML
INJECTION, SOLUTION INTRAVENOUS CONTINUOUS
Status: DISCONTINUED | OUTPATIENT
Start: 2023-03-28 | End: 2023-03-28 | Stop reason: HOSPADM

## 2023-03-28 RX ORDER — OXYCODONE HYDROCHLORIDE 5 MG/1
5 TABLET ORAL
Status: DISCONTINUED | OUTPATIENT
Start: 2023-03-28 | End: 2023-03-28 | Stop reason: HOSPADM

## 2023-03-28 RX ORDER — HYDROMORPHONE HYDROCHLORIDE 2 MG/ML
0.25 INJECTION, SOLUTION INTRAMUSCULAR; INTRAVENOUS; SUBCUTANEOUS EVERY 5 MIN PRN
Status: DISCONTINUED | OUTPATIENT
Start: 2023-03-28 | End: 2023-03-28 | Stop reason: HOSPADM

## 2023-03-28 RX ORDER — NEOSTIGMINE METHYLSULFATE 1 MG/ML
INJECTION, SOLUTION INTRAVENOUS PRN
Status: DISCONTINUED | OUTPATIENT
Start: 2023-03-28 | End: 2023-03-28 | Stop reason: SDUPTHER

## 2023-03-28 RX ORDER — ONDANSETRON 2 MG/ML
4 INJECTION INTRAMUSCULAR; INTRAVENOUS
Status: COMPLETED | OUTPATIENT
Start: 2023-03-28 | End: 2023-03-28

## 2023-03-28 RX ORDER — GLYCOPYRROLATE 0.2 MG/ML
INJECTION INTRAMUSCULAR; INTRAVENOUS PRN
Status: DISCONTINUED | OUTPATIENT
Start: 2023-03-28 | End: 2023-03-28 | Stop reason: SDUPTHER

## 2023-03-28 RX ADMIN — ONDANSETRON 4 MG: 2 INJECTION INTRAMUSCULAR; INTRAVENOUS at 09:25

## 2023-03-28 RX ADMIN — PROPOFOL 140 MG: 10 INJECTION, EMULSION INTRAVENOUS at 08:32

## 2023-03-28 RX ADMIN — PHENYLEPHRINE HYDROCHLORIDE 100 MCG: 10 INJECTION INTRAVENOUS at 09:05

## 2023-03-28 RX ADMIN — ROCURONIUM BROMIDE 40 MG: 50 INJECTION, SOLUTION INTRAVENOUS at 08:32

## 2023-03-28 RX ADMIN — DEXAMETHASONE SODIUM PHOSPHATE 10 MG: 4 INJECTION, SOLUTION INTRAMUSCULAR; INTRAVENOUS at 08:45

## 2023-03-28 RX ADMIN — FENTANYL CITRATE 50 MCG: 50 INJECTION INTRAMUSCULAR; INTRAVENOUS at 08:41

## 2023-03-28 RX ADMIN — PROPOFOL 50 MG: 10 INJECTION, EMULSION INTRAVENOUS at 09:16

## 2023-03-28 RX ADMIN — SODIUM CHLORIDE, POTASSIUM CHLORIDE, SODIUM LACTATE AND CALCIUM CHLORIDE: 600; 310; 30; 20 INJECTION, SOLUTION INTRAVENOUS at 07:37

## 2023-03-28 RX ADMIN — Medication 10 MG: at 08:49

## 2023-03-28 RX ADMIN — LIDOCAINE HYDROCHLORIDE 60 MG: 20 INJECTION, SOLUTION EPIDURAL; INFILTRATION; INTRACAUDAL; PERINEURAL at 08:32

## 2023-03-28 RX ADMIN — GLYCOPYRROLATE 0.4 MG: 0.2 INJECTION INTRAMUSCULAR; INTRAVENOUS at 09:28

## 2023-03-28 RX ADMIN — Medication 3 MG: at 09:28

## 2023-03-28 RX ADMIN — Medication 10 MG: at 08:58

## 2023-03-28 ASSESSMENT — PAIN - FUNCTIONAL ASSESSMENT: PAIN_FUNCTIONAL_ASSESSMENT: NONE - DENIES PAIN

## 2023-03-28 NOTE — OP NOTE
was introduced into the airways to identify and biopsy the RUL mass with the aid of fluoroscopy and radial probe US. CT images acquired with thin slice protocol were used to plan the pathway to target lesion planned using [employer]. Planning data was then used to navigate to the nodule, with verification of location using radial US. Visibility with radial US was: excellent - concentric tumor  8 transbronchial lung biopsies were obtained and evaluated via touch prep and CONCEPCIÓN. Touch preps revealed atypical cells. Histology from obtained tissue is currently pending. TOUCH PREP BIOPSY# MALIGNANT ATYPIA/SUSPICIOUS DIAGNOSIS   RUL TBNA 1 - - -    2 - - -    3 - - -   RUL Tbbx 1 - atypical -    2 - - -    3 - atypical -    4 - atypical -    5 Formalin      6 Formalin      7 Formalin      8 Formalin         ADDITIONAL BIOPSIES  Mini BAL - cell count, diff, routine culture, anaerobes, afb, fungus  TBNA with fluid return from [place] - routine culture, anaerobes      EBUS  After completing the airway inspection an Olympus  F EBUS bronchoscope was introduced into the trachea through the vocal chords without complication. The balloon was inflated with saline and a mediastinal inspection commenced:      STATION SIZE IN CM   12R  No target   11R No target   10R No target   4R No target   2R No target   7 1cm   2L No target   4L No target   10L No target   11L No target         After identifying targets the following samples were obtained:    STATION PASS# LYMPHOCYTES ATYPIA GRANULOMA DIAGNOSIS   7 1 blood - - -    2 pauci - - -    3 + - - -    4 + - - -     EBL: <38PC    Complications: none    Diagnosis: RUL cavitary mass - malignant vs infectious seems most likely    Plan: Will follow up the above pathology as well as cultures. If this is completely non diagnostic would consider CT guided biopsy next.        Cynthia Jones MD

## 2023-03-28 NOTE — INTERVAL H&P NOTE
Update History & Physical    The patient's History and Physical of March 2,  was reviewed with the patient and I examined the patient. There was no change. The surgical site was confirmed by the patient and me. Plan: The risks, benefits, expected outcome, and alternative to the recommended procedure have been discussed with the patient. Patient understands and wants to proceed with the procedure.      Electronically signed by Yahaira Hanley MD on 3/28/2023 at 8:21 AM

## 2023-03-28 NOTE — ANESTHESIA PROCEDURE NOTES
Airway  Date/Time: 3/28/2023 8:35 AM  Urgency: elective    Airway not difficult    General Information and Staff    Patient location during procedure: OR  Resident/CRNA: Joana Mensah APRN - CRNA  Performed: resident/CRNA     Indications and Patient Condition  Indications for airway management: anesthesia  Spontaneous Ventilation: absent  Sedation level: deep  Preoxygenated: yes  Patient position: sniffing  MILS not maintained throughout  Mask difficulty assessment: vent by bag mask    Final Airway Details  Final airway type: endotracheal airway      Successful airway: ETT  Cuffed: yes   Successful intubation technique: direct laryngoscopy  Facilitating devices/methods: intubating stylet  Endotracheal tube insertion site: oral  Blade: Ember  Blade size: #3  ETT size (mm): 8.5  Cormack-Lehane Classification: grade I - full view of glottis  Placement verified by: chest auscultation and capnometry   Measured from: lips  ETT to lips (cm): 23  Number of attempts at approach: 1  Ventilation between attempts: bag mask  Number of other approaches attempted: 0    no

## 2023-03-28 NOTE — ANESTHESIA PRE PROCEDURE
IntraVENous PRN Tor Bañuelos MD           Allergies: Allergies   Allergen Reactions    Penicillins Other (See Comments) and Rash    Aspirin Other (See Comments)     Makes her \"heart beat too fast\"    Bupropion Other (See Comments)     Tremors, tachycardia    Escitalopram Other (See Comments)    Hydroxyzine Hcl Other (See Comments)    Levofloxacin Other (See Comments)    Loratadine Other (See Comments)     sleepy    Methylprednisolone Acetate Other (See Comments)     Light headed/anxious    Pantoprazole Hives    Sertraline Other (See Comments)     Felt spacy, but will be willing to try again    Sulfa Antibiotics Other (See Comments)    Acetaminophen Other (See Comments)     Was told not to take due to \"abnormal liver blood work\"    Sulfamethoxazole-Trimethoprim Rash       Problem List:    Patient Active Problem List   Diagnosis Code    Gastroesophageal reflux K21.9    Pulmonary nodule R91.1    Anxiety disorder F41.9    B12 deficiency E53.8    Mild intermittent asthma without complication Z86.86    Mixed hyperlipidemia E78.2    Dyspareunia in female N94.10    Osteopenia M85.80    Hashimoto's disease E06.3    Bronchiectasis without complication ([address] T06.9    Irritable bowel syndrome with diarrhea K58.0       Past Medical History:        Diagnosis Date    Anemia     Anxiety disorder     B12 deficiency     Bronchiectasis without complication ([address] 81/7/2023    Depression     Gastroesophageal reflux     Hashimoto's disease 8/16/2017    pt denies (3/15/2023)    Hx of blood clots     resolved, could not tolerate meds to treat; resolved with \"compresses\".  Happened \"years ago\"per pt    Irritable bowel syndrome     Irritable bowel syndrome with diarrhea     Menopause     46s    Mild intermittent asthma without complication 85/0/2782    Osteopenia        Past Surgical History:        Procedure Laterality Date    APPENDECTOMY      CHOLECYSTECTOMY      ORTHOPEDIC SURGERY Left 2007

## 2023-03-28 NOTE — DISCHARGE INSTRUCTIONS

## 2023-03-29 LAB
ACID FAST STN SPEC: NEGATIVE
CYTOLOGY-NON GYN: NORMAL
MYCOBACTERIUM SPEC QL CULT: NORMAL
SPECIMEN PREPARATION: NORMAL
SPECIMEN SOURCE: NORMAL
SPECIMEN SOURCE: NORMAL

## 2023-03-30 LAB
BACTERIA SPEC CULT: NORMAL
BACTERIA SPEC CULT: NORMAL
FUNGAL CULT/SMEAR: NORMAL
GRAM STN SPEC: NORMAL
SERVICE CMNT-IMP: NORMAL
SERVICE CMNT-IMP: NORMAL
SPECIMEN PROCESSING: NORMAL
SPECIMEN SOURCE: NORMAL

## 2023-04-01 LAB
FUNGUS SMEAR: NORMAL
SPECIMEN SOURCE: NORMAL

## 2023-04-03 ENCOUNTER — OFFICE VISIT (OUTPATIENT)
Dept: INTERNAL MEDICINE CLINIC | Facility: CLINIC | Age: 63
End: 2023-04-03
Payer: COMMERCIAL

## 2023-04-03 VITALS
WEIGHT: 110 LBS | RESPIRATION RATE: 16 BRPM | HEIGHT: 63 IN | DIASTOLIC BLOOD PRESSURE: 64 MMHG | SYSTOLIC BLOOD PRESSURE: 110 MMHG | BODY MASS INDEX: 19.49 KG/M2 | HEART RATE: 76 BPM | OXYGEN SATURATION: 98 %

## 2023-04-03 DIAGNOSIS — N76.0 ACUTE VAGINITIS: Primary | ICD-10-CM

## 2023-04-03 DIAGNOSIS — N89.8 VAGINAL DISCHARGE: ICD-10-CM

## 2023-04-03 DIAGNOSIS — N94.9 VAGINAL BURNING: ICD-10-CM

## 2023-04-03 LAB
BILIRUBIN, URINE, POC: ABNORMAL
BLOOD URINE, POC: NEGATIVE
GLUCOSE URINE, POC: NEGATIVE
KETONES, URINE, POC: NEGATIVE
LEUKOCYTE ESTERASE, URINE, POC: ABNORMAL
NITRITE, URINE, POC: NEGATIVE
PH, URINE, POC: 6 (ref 4.6–8)
PROTEIN,URINE, POC: ABNORMAL
SPECIFIC GRAVITY, URINE, POC: 1.03 (ref 1–1.03)
URINALYSIS CLARITY, POC: ABNORMAL
URINALYSIS COLOR, POC: YELLOW
UROBILINOGEN, POC: ABNORMAL

## 2023-04-03 PROCEDURE — 81003 URINALYSIS AUTO W/O SCOPE: CPT | Performed by: NURSE PRACTITIONER

## 2023-04-03 PROCEDURE — 99214 OFFICE O/P EST MOD 30 MIN: CPT | Performed by: NURSE PRACTITIONER

## 2023-04-03 RX ORDER — FLUCONAZOLE 150 MG/1
150 TABLET ORAL ONCE
Qty: 1 TABLET | Refills: 0 | Status: SHIPPED | OUTPATIENT
Start: 2023-04-03 | End: 2023-04-03

## 2023-04-03 RX ORDER — ESTRADIOL 0.1 MG/G
2 CREAM VAGINAL
Qty: 42.5 G | Refills: 3 | Status: SHIPPED | OUTPATIENT
Start: 2023-04-03 | End: 2023-04-03 | Stop reason: ALTCHOICE

## 2023-04-03 RX ORDER — LORAZEPAM 0.5 MG/1
TABLET ORAL
COMMUNITY
Start: 2023-03-17 | End: 2023-04-03 | Stop reason: ALTCHOICE

## 2023-04-03 RX ORDER — ESTRADIOL 0.1 MG/G
1 CREAM VAGINAL
Qty: 42.5 G | Refills: 3 | Status: SHIPPED | OUTPATIENT
Start: 2023-04-03

## 2023-04-03 RX ORDER — PHENAZOPYRIDINE HYDROCHLORIDE 100 MG/1
TABLET, FILM COATED ORAL
COMMUNITY
Start: 2023-03-22

## 2023-04-03 ASSESSMENT — ENCOUNTER SYMPTOMS
BACK PAIN: 0
ABDOMINAL PAIN: 0
VOMITING: 0
CONSTIPATION: 0
DIARRHEA: 0
SORE THROAT: 0
NAUSEA: 0

## 2023-04-03 ASSESSMENT — PATIENT HEALTH QUESTIONNAIRE - PHQ9
2. FEELING DOWN, DEPRESSED OR HOPELESS: 0
SUM OF ALL RESPONSES TO PHQ QUESTIONS 1-9: 0
1. LITTLE INTEREST OR PLEASURE IN DOING THINGS: 0
SUM OF ALL RESPONSES TO PHQ QUESTIONS 1-9: 0
SUM OF ALL RESPONSES TO PHQ QUESTIONS 1-9: 0
SUM OF ALL RESPONSES TO PHQ9 QUESTIONS 1 & 2: 0
SUM OF ALL RESPONSES TO PHQ QUESTIONS 1-9: 0

## 2023-04-03 NOTE — PROGRESS NOTES
discharge. Negative for dysuria, flank pain, frequency, hematuria and urgency. Musculoskeletal:  Negative for back pain. Skin:  Negative for rash. Objective   Physical Exam  Vitals reviewed. Exam conducted with a chaperone present. Constitutional:       Appearance: Normal appearance. She is not ill-appearing. HENT:      Head: Normocephalic. Cardiovascular:      Rate and Rhythm: Normal rate and regular rhythm. Genitourinary:     General: Normal vulva. Vagina: No foreign body. Vaginal discharge and tenderness present. No erythema, bleeding or prolapsed vaginal walls. Cervix: Friability present. Adnexa: Right adnexa normal and left adnexa normal.   Musculoskeletal:      Cervical back: Neck supple. Neurological:      Mental Status: She is alert. An electronic signature was used to authenticate this note.     --VILLA Hernandez - CNP

## 2023-04-04 ENCOUNTER — TELEPHONE (OUTPATIENT)
Dept: PULMONOLOGY | Age: 63
End: 2023-04-04

## 2023-04-04 DIAGNOSIS — R91.1 PULMONARY NODULE: Primary | ICD-10-CM

## 2023-04-04 NOTE — TELEPHONE ENCOUNTER
Per Mrs Pau Ramos, arrange PET scan and CT guided BX. Referral to IR faxed for consideration of RUL nodule BX. PET scan scheduled on 4/11. Await IR to schedule BX and will place on AVS for appointment on 4/7 with Mrs Pau Ramos.

## 2023-04-05 LAB
A VAGINAE DNA VAG QL NAA+PROBE: NORMAL SCORE
BVAB2 DNA VAG QL NAA+PROBE: NORMAL SCORE
C ALBICANS DNA VAG QL NAA+PROBE: NEGATIVE
C GLABRATA DNA VAG QL NAA+PROBE: NEGATIVE
C TRACH RRNA SPEC QL NAA+PROBE: NEGATIVE
CANDIDA KRUSEI: NEGATIVE
CANDIDA LUSITANIAE, NAA, 180015: NEGATIVE
CANDIDA PARAPSILOSIS/TROPICALIS: NEGATIVE
MEGA1 DNA VAG QL NAA+PROBE: NORMAL SCORE
N GONORRHOEA RRNA SPEC QL NAA+PROBE: NEGATIVE
T VAGINALIS RRNA SPEC QL NAA+PROBE: NEGATIVE

## 2023-04-06 ENCOUNTER — CLINICAL DOCUMENTATION (OUTPATIENT)
Dept: PULMONOLOGY | Age: 63
End: 2023-04-06

## 2023-04-06 LAB
BACTERIA SPEC CULT: NORMAL
SERVICE CMNT-IMP: NORMAL

## 2023-04-06 NOTE — PROGRESS NOTES
small indeterminate liver lesions and 1 area of scarring/lung nodule in the LLL. She had repeat CT 2/21/18 with increased mucoid impaction injury and nodularity in RUL, RML, and GIUSEPPE felt to be inflammatory. She was then referred to Dr. Danica Jean Baptiste at [facility]. She was felt to have moderate persistent asthma in the setting of bronchiectasis. AFB culture was negative at that time. She had a chest CT 1/19/23 at [address] [facility] which revealed a 4cm x 2.7 cm x 2.5cm RUL opacity with an air fluid level. There is also adjacent bronchiectasis with reticular nodular disease and GGO in the RUL. She also had a 5.5 x 9.6 x 5.3 mm nodule in the minor fissure of the RUL. There was also bilateral apical scarring. She had repeat HRCT on 3/9 without improvement. Pt was then set up for bronch with EBUS and airway inspection. There were atypical cells in Tbbx of RUL. The results were nondiagnostic. It is felt that pt needs PET CT and CT guided biopsy by IR. Pt is very nervous today. We reviewed her results and the imaging. We discussed the recommendations for the IR biopsy and PET CT. Pt is very anxious. We had to review the plan multiple times. She also has to have labs drawn today. We had to review how to get to the lab multiple times as well. Pt denies cough. She reports that she feels well. She denies hemoptysis. She denies fever, chills, or sweats. REVIEW OF SYSTEMS: 10 point review of systems is negative except as reported in HPI. PHYSICAL EXAM: Body mass index is 20.12 kg/m². Vitals:    04/07/23 1058   BP: 129/79   Pulse: 75   Resp: 14   Temp: 98 °F (36.7 °C)   SpO2: 99%   Weight: 110 lb (49.9 kg)   Height: 5' 2\" (1.575 m)         General:   Alert, cooperative, no distress, appears stated age, on RA. Eyes:   Conjunctivae/corneas clear.  PERRL        Mouth/Throat:  Lips, mucosa, and tongue normal. Teeth and gums normal.        Lungs:     clear     Heart:   Regular rate and rhythm, S1, S2 normal, no murmur,

## 2023-04-06 NOTE — PROGRESS NOTES
Lab work pending for CT guided BX on 4/10. If patient agreeable post appointment, please send to lab on 4/7.

## 2023-04-07 ENCOUNTER — OFFICE VISIT (OUTPATIENT)
Dept: PULMONOLOGY | Age: 63
End: 2023-04-07
Payer: COMMERCIAL

## 2023-04-07 ENCOUNTER — NURSE ONLY (OUTPATIENT)
Dept: INTERNAL MEDICINE CLINIC | Facility: CLINIC | Age: 63
End: 2023-04-07
Payer: COMMERCIAL

## 2023-04-07 VITALS
DIASTOLIC BLOOD PRESSURE: 79 MMHG | BODY MASS INDEX: 20.24 KG/M2 | HEART RATE: 75 BPM | SYSTOLIC BLOOD PRESSURE: 129 MMHG | WEIGHT: 110 LBS | TEMPERATURE: 98 F | RESPIRATION RATE: 14 BRPM | OXYGEN SATURATION: 99 % | HEIGHT: 62 IN

## 2023-04-07 DIAGNOSIS — Z01.818 PREOP TESTING: ICD-10-CM

## 2023-04-07 DIAGNOSIS — R91.1 PULMONARY NODULE: Primary | ICD-10-CM

## 2023-04-07 DIAGNOSIS — E53.8 B12 DEFICIENCY: Primary | ICD-10-CM

## 2023-04-07 DIAGNOSIS — R91.1 PULMONARY NODULE: ICD-10-CM

## 2023-04-07 LAB
APTT PPP: 26.3 SEC (ref 24.5–34.2)
BASOPHILS # BLD: 0.1 K/UL (ref 0–0.2)
BASOPHILS NFR BLD: 1 % (ref 0–2)
DIFFERENTIAL METHOD BLD: ABNORMAL
EOSINOPHIL # BLD: 0 K/UL (ref 0–0.8)
EOSINOPHIL NFR BLD: 1 % (ref 0.5–7.8)
ERYTHROCYTE [DISTWIDTH] IN BLOOD BY AUTOMATED COUNT: 12.1 % (ref 11.9–14.6)
HCT VFR BLD AUTO: 40.2 % (ref 35.8–46.3)
HGB BLD-MCNC: 13.2 G/DL (ref 11.7–15.4)
IMM GRANULOCYTES # BLD AUTO: 0 K/UL (ref 0–0.5)
IMM GRANULOCYTES NFR BLD AUTO: 0 % (ref 0–5)
INR PPP: 1
LYMPHOCYTES # BLD: 1 K/UL (ref 0.5–4.6)
LYMPHOCYTES NFR BLD: 23 % (ref 13–44)
MCH RBC QN AUTO: 33.1 PG (ref 26.1–32.9)
MCHC RBC AUTO-ENTMCNC: 32.8 G/DL (ref 31.4–35)
MCV RBC AUTO: 100.8 FL (ref 82–102)
MONOCYTES # BLD: 0.3 K/UL (ref 0.1–1.3)
MONOCYTES NFR BLD: 8 % (ref 4–12)
NEUTS SEG # BLD: 2.8 K/UL (ref 1.7–8.2)
NEUTS SEG NFR BLD: 67 % (ref 43–78)
NRBC # BLD: 0 K/UL (ref 0–0.2)
PLATELET # BLD AUTO: 250 K/UL (ref 150–450)
PMV BLD AUTO: 10 FL (ref 9.4–12.3)
PROTHROMBIN TIME: 13.4 SEC (ref 12.6–14.3)
RBC # BLD AUTO: 3.99 M/UL (ref 4.05–5.2)
WBC # BLD AUTO: 4.3 K/UL (ref 4.3–11.1)

## 2023-04-07 PROCEDURE — 99214 OFFICE O/P EST MOD 30 MIN: CPT | Performed by: PHYSICIAN ASSISTANT

## 2023-04-07 RX ORDER — CYANOCOBALAMIN 1000 UG/ML
1000 INJECTION, SOLUTION INTRAMUSCULAR; SUBCUTANEOUS ONCE
Status: COMPLETED | OUTPATIENT
Start: 2023-04-07 | End: 2023-04-07

## 2023-04-07 RX ADMIN — CYANOCOBALAMIN 1000 MCG: 1000 INJECTION, SOLUTION INTRAMUSCULAR; SUBCUTANEOUS at 08:46

## 2023-04-08 LAB
ANION GAP SERPL CALC-SCNC: 5 MMOL/L (ref 2–11)
BUN SERPL-MCNC: 8 MG/DL (ref 8–23)
CALCIUM SERPL-MCNC: 9.6 MG/DL (ref 8.3–10.4)
CHLORIDE SERPL-SCNC: 107 MMOL/L (ref 101–110)
CO2 SERPL-SCNC: 25 MMOL/L (ref 21–32)
CREAT SERPL-MCNC: 0.8 MG/DL (ref 0.6–1)
GLUCOSE SERPL-MCNC: 98 MG/DL (ref 65–100)
POTASSIUM SERPL-SCNC: 3.7 MMOL/L (ref 3.5–5.1)
SODIUM SERPL-SCNC: 137 MMOL/L (ref 133–143)

## 2023-04-17 DIAGNOSIS — F41.0 PANIC DISORDER WITHOUT AGORAPHOBIA: ICD-10-CM

## 2023-04-17 RX ORDER — LORAZEPAM 0.5 MG/1
0.5 TABLET ORAL DAILY PRN
Qty: 30 TABLET | Refills: 2 | Status: SHIPPED | OUTPATIENT
Start: 2023-04-17 | End: 2023-05-17

## 2023-04-17 RX ORDER — LORAZEPAM 1 MG/1
1 TABLET ORAL EVERY 6 HOURS PRN
Qty: 60 TABLET | Refills: 0 | Status: CANCELLED | OUTPATIENT
Start: 2023-04-17 | End: 2023-05-17

## 2023-04-17 NOTE — TELEPHONE ENCOUNTER
NEEDS A REFILL ON    LORazepam (ATIVAN) 1 MG tablet     SENT TO [facility]      PT PHONE NUMBER [phone]

## 2023-04-19 DIAGNOSIS — R91.8 LUNG MASS: ICD-10-CM

## 2023-04-19 DIAGNOSIS — R91.1 PULMONARY NODULE: ICD-10-CM

## 2023-04-21 LAB
C-ANCA TITR SER IF: NORMAL TITER
MYELOPEROXIDASE AB SER IA-ACNC: <0.2 UNITS (ref 0–0.9)
P-ANCA ATYPICAL TITR SER IF: NORMAL TITER
P-ANCA TITR SER IF: NORMAL TITER
PROTEINASE3 AB SER IA-ACNC: <0.2 UNITS (ref 0–0.9)

## 2023-04-27 LAB
FUNGAL CULT/SMEAR: NORMAL
FUNGUS (MYCOLOGY) CULTURE: NEGATIVE
FUNGUS SMEAR: NORMAL
REFLEX TO ID: NORMAL
SPECIMEN PROCESSING: NORMAL
SPECIMEN SOURCE: NORMAL
SPECIMEN SOURCE: NORMAL

## 2023-05-08 ENCOUNTER — NURSE ONLY (OUTPATIENT)
Dept: INTERNAL MEDICINE CLINIC | Facility: CLINIC | Age: 63
End: 2023-05-08
Payer: COMMERCIAL

## 2023-05-08 DIAGNOSIS — E53.8 B12 DEFICIENCY: Primary | ICD-10-CM

## 2023-05-08 PROCEDURE — 96372 THER/PROPH/DIAG INJ SC/IM: CPT | Performed by: FAMILY MEDICINE

## 2023-05-08 RX ORDER — CYANOCOBALAMIN 1000 UG/ML
1000 INJECTION, SOLUTION INTRAMUSCULAR; SUBCUTANEOUS ONCE
Status: COMPLETED | OUTPATIENT
Start: 2023-05-08 | End: 2023-05-08

## 2023-05-08 RX ADMIN — CYANOCOBALAMIN 1000 MCG: 1000 INJECTION, SOLUTION INTRAMUSCULAR; SUBCUTANEOUS at 09:06

## 2023-05-15 LAB
ACID FAST STN SPEC: NEGATIVE
MYCOBACTERIUM SPEC QL CULT: NEGATIVE
SPECIMEN PREPARATION: NORMAL
SPECIMEN SOURCE: NORMAL

## 2023-05-23 ENCOUNTER — OFFICE VISIT (OUTPATIENT)
Dept: INTERNAL MEDICINE CLINIC | Facility: CLINIC | Age: 63
End: 2023-05-23
Payer: COMMERCIAL

## 2023-05-23 VITALS
BODY MASS INDEX: 20.43 KG/M2 | TEMPERATURE: 98.8 F | HEIGHT: 62 IN | SYSTOLIC BLOOD PRESSURE: 126 MMHG | RESPIRATION RATE: 16 BRPM | OXYGEN SATURATION: 98 % | WEIGHT: 111 LBS | DIASTOLIC BLOOD PRESSURE: 72 MMHG | HEART RATE: 70 BPM

## 2023-05-23 DIAGNOSIS — J47.9 BRONCHIECTASIS WITHOUT COMPLICATION (HCC): ICD-10-CM

## 2023-05-23 DIAGNOSIS — R91.1 PULMONARY NODULE: ICD-10-CM

## 2023-05-23 DIAGNOSIS — N93.9 VAGINAL BLEEDING: ICD-10-CM

## 2023-05-23 DIAGNOSIS — N95.2 POSTMENOPAUSAL ATROPHIC VAGINITIS: ICD-10-CM

## 2023-05-23 DIAGNOSIS — J45.20 MILD INTERMITTENT ASTHMA WITHOUT COMPLICATION: Primary | ICD-10-CM

## 2023-05-23 LAB
BILIRUBIN, URINE, POC: NORMAL
BLOOD URINE, POC: NORMAL
GLUCOSE URINE, POC: NEGATIVE
KETONES, URINE, POC: NEGATIVE
LEUKOCYTE ESTERASE, URINE, POC: NORMAL
NITRITE, URINE, POC: NEGATIVE
PH, URINE, POC: 6 (ref 4.6–8)
PROTEIN,URINE, POC: NORMAL
SPECIFIC GRAVITY, URINE, POC: 1.03 (ref 1–1.03)
URINALYSIS CLARITY, POC: CLEAR
URINALYSIS COLOR, POC: YELLOW
UROBILINOGEN, POC: NORMAL

## 2023-05-23 PROCEDURE — 81003 URINALYSIS AUTO W/O SCOPE: CPT | Performed by: NURSE PRACTITIONER

## 2023-05-23 PROCEDURE — 99214 OFFICE O/P EST MOD 30 MIN: CPT | Performed by: NURSE PRACTITIONER

## 2023-05-23 RX ORDER — LORAZEPAM 0.5 MG/1
TABLET ORAL
COMMUNITY
Start: 2023-05-17

## 2023-05-23 RX ORDER — AZITHROMYCIN 250 MG/1
250 TABLET, FILM COATED ORAL SEE ADMIN INSTRUCTIONS
Qty: 6 TABLET | Refills: 0 | Status: SHIPPED | OUTPATIENT
Start: 2023-05-23 | End: 2023-05-28

## 2023-05-23 ASSESSMENT — ENCOUNTER SYMPTOMS
SORE THROAT: 1
SHORTNESS OF BREATH: 0
HEMOPTYSIS: 1
WHEEZING: 0
COUGH: 1

## 2023-05-23 ASSESSMENT — PATIENT HEALTH QUESTIONNAIRE - PHQ9
SUM OF ALL RESPONSES TO PHQ QUESTIONS 1-9: 0
SUM OF ALL RESPONSES TO PHQ QUESTIONS 1-9: 0
2. FEELING DOWN, DEPRESSED OR HOPELESS: 0
SUM OF ALL RESPONSES TO PHQ QUESTIONS 1-9: 0
1. LITTLE INTEREST OR PLEASURE IN DOING THINGS: 0
SUM OF ALL RESPONSES TO PHQ QUESTIONS 1-9: 0
SUM OF ALL RESPONSES TO PHQ9 QUESTIONS 1 & 2: 0

## 2023-05-23 NOTE — PROGRESS NOTES
Lali Palomino (:  1960) is a 60 y.o. female,Established patient, here for evaluation of the following chief complaint(s):  Cough and Allergies         ASSESSMENT/PLAN:  1. Mild intermittent asthma without complication  2. Bronchiectasis without complication (HCC)  3. Pulmonary nodule  4. Postmenopausal atrophic vaginitis  -      [facility] [address]      No follow-ups on file. No wheeze on exam   Lungs clear, afebrile  But has had productive cough, green and blood tinged  Start azithromycin if no improvement in a few days  Continue breo and albuterol   She is again unsure if she has vaginal discharge or bleeding, she isn't using the estradiol vaginal  She is using another  cream but unsure of what - up to date on pap, pelvic exam here in  with blood and Leukocytes, will culture  Has seen GYN - advise since this is recurring issue would recommend f/u again with GYN      Subjective   SUBJECTIVE/OBJECTIVE:  Patient is here for cough. She started with sore throat, then started with productive cough and congestion. She has had productive cough with a spot of blood. She is using her breo. She started allegra and nose spray again last week. She had stopped it thinking she no longer needed it but knows she did better when she took it. Cough  This is a new problem. The current episode started in the past 7 days. The cough is Productive of sputum and productive of blood-tinged sputum. Associated symptoms include hemoptysis and a sore throat. Pertinent negatives include no chest pain, chills, fever, myalgias, shortness of breath or wheezing. Allergies  She complains of cough and sore throat. She reports no fever or wheezing. Review of Systems   Constitutional:  Negative for chills and fever. HENT:  Positive for sore throat. Respiratory:  Positive for cough and hemoptysis. Negative for shortness of breath and wheezing. Cardiovascular:  Negative for chest pain.

## 2023-05-26 LAB
BACTERIA SPEC CULT: NORMAL
SERVICE CMNT-IMP: NORMAL

## 2023-05-31 NOTE — PROGRESS NOTES
Name:  Lidia Bazan  YOB: 1960   MRN: [id number]      Office Visit: 6/1/2023        ASSESSMENT AND PLAN:  (Medical Decision Making)    Impression: 60 y.o. female with a history of abnormal chest CT felt to be related to pulmonary abscess. She did not improve after 6 weeks of antibiotics. She had bronch with atypical cells present and IR biopsy revealed granuloma. 1. Abnormal chest CT  Pt's biopsy was negative. I will discuss pt with Dr. Hafsa Meyer and find out when she thinks we need to reimage pt. I will contact pt and place orders after I have discussed this with her. 2. Pulmonary nodule  Biopsy negative for malignancy and only revealed granuloma. 3. Bronchiectasis without complication ([address]  Continue mucinex as needed, recommended liquid guaifenesin if the tablet is too difficult to swallow. No orders of the defined types were placed in this encounter. No orders of the defined types were placed in this encounter. Follow-up and Dispositions    Return in about 8 weeks (around 7/27/2023) for with [facility]. ANG Peterson    No specialty comments available. Total time for encounter on day of encounter was 31 minutes. This time includes chart prep, review of tests/procedures, review of other provider's notes, documentation and counseling patient regarding disease process and medications. _________________________________________________________________________    HISTORY OF PRESENT ILLNESS:    Ms. Julianna Treviño is a 60 y.o. female who is seen at [facility] for follow up. Pt had an episode of hemoptysis and cough x 1 several weeks ago. She was referred urgently to our office for further evaluation. Pt was hospitalized 10/9-10/21/16 after a fall in the shower resulting in mulitple rib fractures, small L pleural effusion, and pt was found to have hemothorax.  She was seen by thoracic surgery and underwent VATS with evacuation of hemothorax and

## 2023-06-01 ENCOUNTER — OFFICE VISIT (OUTPATIENT)
Dept: PULMONOLOGY | Age: 63
End: 2023-06-01
Payer: COMMERCIAL

## 2023-06-01 VITALS
HEIGHT: 64 IN | SYSTOLIC BLOOD PRESSURE: 140 MMHG | BODY MASS INDEX: 19.12 KG/M2 | WEIGHT: 112 LBS | RESPIRATION RATE: 14 BRPM | OXYGEN SATURATION: 100 % | HEART RATE: 68 BPM | DIASTOLIC BLOOD PRESSURE: 88 MMHG

## 2023-06-01 DIAGNOSIS — R93.89 ABNORMAL CHEST CT: Primary | ICD-10-CM

## 2023-06-01 DIAGNOSIS — J47.9 BRONCHIECTASIS WITHOUT COMPLICATION (HCC): ICD-10-CM

## 2023-06-01 DIAGNOSIS — R91.1 PULMONARY NODULE: ICD-10-CM

## 2023-06-01 PROCEDURE — 99214 OFFICE O/P EST MOD 30 MIN: CPT | Performed by: PHYSICIAN ASSISTANT

## 2023-06-01 NOTE — PATIENT INSTRUCTIONS
If you have trouble swallowing the Mucinex tablet, you can take plain generic liquid guaifenesin instead but you would need to take the syrup more frequently.

## 2023-06-02 ENCOUNTER — TELEPHONE (OUTPATIENT)
Dept: PULMONOLOGY | Age: 63
End: 2023-06-02

## 2023-06-02 DIAGNOSIS — R91.1 PULMONARY NODULE: ICD-10-CM

## 2023-06-02 DIAGNOSIS — R93.89 ABNORMAL CHEST CT: Primary | ICD-10-CM

## 2023-06-02 NOTE — TELEPHONE ENCOUNTER
I spoke with Dr. Anderson Done and she agreed with repeat chest CT. I contacted pt and she is agreeable. Order placed now. Radiology will contact her to schedule CT.

## 2023-06-07 ENCOUNTER — OFFICE VISIT (OUTPATIENT)
Dept: OBGYN CLINIC | Age: 63
End: 2023-06-07
Payer: COMMERCIAL

## 2023-06-07 VITALS
WEIGHT: 112 LBS | HEIGHT: 64 IN | SYSTOLIC BLOOD PRESSURE: 136 MMHG | BODY MASS INDEX: 19.12 KG/M2 | DIASTOLIC BLOOD PRESSURE: 88 MMHG

## 2023-06-07 DIAGNOSIS — R30.0 BURNING WITH URINATION: Primary | ICD-10-CM

## 2023-06-07 DIAGNOSIS — N89.8 VAGINAL DISCHARGE: ICD-10-CM

## 2023-06-07 LAB
BILIRUBIN, URINE, POC: ABNORMAL
BLOOD URINE, POC: ABNORMAL
GLUCOSE URINE, POC: NEGATIVE
KETONES, URINE, POC: ABNORMAL
LEUKOCYTE ESTERASE, URINE, POC: NEGATIVE
NITRITE, URINE, POC: NEGATIVE
PH, URINE, POC: 7 (ref 4.6–8)
PROTEIN,URINE, POC: 30
SPECIFIC GRAVITY, URINE, POC: 1.02 (ref 1–1.03)
URINALYSIS CLARITY, POC: CLEAR
URINALYSIS COLOR, POC: YELLOW
UROBILINOGEN, POC: NORMAL

## 2023-06-07 PROCEDURE — 99213 OFFICE O/P EST LOW 20 MIN: CPT | Performed by: NURSE PRACTITIONER

## 2023-06-07 PROCEDURE — 81001 URINALYSIS AUTO W/SCOPE: CPT | Performed by: NURSE PRACTITIONER

## 2023-06-07 ASSESSMENT — PATIENT HEALTH QUESTIONNAIRE - PHQ9
SUM OF ALL RESPONSES TO PHQ QUESTIONS 1-9: 0
SUM OF ALL RESPONSES TO PHQ9 QUESTIONS 1 & 2: 0
1. LITTLE INTEREST OR PLEASURE IN DOING THINGS: 0
2. FEELING DOWN, DEPRESSED OR HOPELESS: 0
SUM OF ALL RESPONSES TO PHQ QUESTIONS 1-9: 0

## 2023-06-07 NOTE — ASSESSMENT & PLAN NOTE
Exam today unremarkable, scant discharge noted    Atrophic vaginitis, pt not using estrogen as prescribed. We discuss restarting estrogen at nightly for 2 weeks and then 2x/week as prescribed    Scant VB noted by pt x1 occurrence.  Will check endometrial strip on US at next visit  But suspect r/t vaginal atrophy

## 2023-06-07 NOTE — PROGRESS NOTES
I have reviewed the patient's visit today including history, exam and assessment by LEXX Rosas. I agree with treatment/plan as above.     Sarah Nieves MD  11:30 AM  06/07/23
Pt comes in today for possible infection. Pt reported spots of blood but has not had it since, that was 05/23/23. Pt reports some urinary burning this morning. LAST PAP:  07/30/2021 negative negative     LAST MAMMO:  05/21/2022     LMP:  No LMP recorded.  Patient is postmenopausal.    BIRTH CONTROL:  post menopausal status    TOBACCO USE:  No    FAMILY HISTORY OF:   Breast Cancer:  Yes   Ovarian Cancer:  No   Uterine Cancer:  No   Colon Cancer:  No    Vitals:    06/07/23 0912   BP: 136/88   Site: Left Upper Arm   Position: Sitting   Weight: 112 lb (50.8 kg)   Height: 5' 4\" (1.626 m)        Eliud Boyle MA  06/07/23  9:20 AM
Encounter   Procedures    Nuswab Vaginitis Plus (VG+)    AMB POC URINALYSIS DIP STICK AUTO W/ MICRO       Outpatient Encounter Medications as of 6/7/2023   Medication Sig Dispense Refill    LORazepam (ATIVAN) 0.5 MG tablet       phenazopyridine (PYRIDIUM) 100 MG tablet       estradiol (ESTRACE) 0.1 MG/GM vaginal cream Place 1 g vaginally Twice a Week 42.5 g 3    CALCIUM PO Take 333 mg by mouth daily      famotidine (PEPCID) 20 MG tablet Take 1 tablet by mouth 2 times daily as needed (Indigestion) 180 tablet 1    Fluticasone Furoate-Vilanterol (BREO ELLIPTA) 100-25 MCG/ACT AEPB Inhale 1 puff into the lungs daily (Patient taking differently: Inhale 1 puff into the lungs nightly) 1 each 5    fluticasone (FLONASE) 50 MCG/ACT nasal spray 2 sprays by Nasal route daily Two puffs in each nosril at bedtime Indications: inflammation of the nose due to an allergy 16 g 5    fexofenadine (ALLEGRA) 180 MG tablet Take by mouth daily      ibuprofen (ADVIL;MOTRIN) 100 MG tablet Take 1 tablet by mouth every 6 hours as needed       Facility-Administered Encounter Medications as of 6/7/2023   Medication Dose Route Frequency Provider Last Rate Last Admin    cyanocobalamin injection 1,000 mcg  1,000 mcg IntraMUSCular Q30 Days Dominguez Almaguer DO   1,000 mcg at 03/07/23 2008 Nine Davi, VILLA - CNP 06/07/23 9:47 AM

## 2023-06-08 ENCOUNTER — NURSE ONLY (OUTPATIENT)
Dept: INTERNAL MEDICINE CLINIC | Facility: CLINIC | Age: 63
End: 2023-06-08
Payer: COMMERCIAL

## 2023-06-08 DIAGNOSIS — E53.8 B12 DEFICIENCY: Primary | ICD-10-CM

## 2023-06-08 PROCEDURE — 96372 THER/PROPH/DIAG INJ SC/IM: CPT | Performed by: FAMILY MEDICINE

## 2023-06-08 RX ORDER — CYANOCOBALAMIN 1000 UG/ML
1000 INJECTION, SOLUTION INTRAMUSCULAR; SUBCUTANEOUS ONCE
Status: COMPLETED | OUTPATIENT
Start: 2023-06-08 | End: 2023-06-08

## 2023-06-08 RX ADMIN — CYANOCOBALAMIN 1000 MCG: 1000 INJECTION, SOLUTION INTRAMUSCULAR; SUBCUTANEOUS at 08:47

## 2023-06-10 LAB
A VAGINAE DNA VAG QL NAA+PROBE: NORMAL SCORE
BVAB2 DNA VAG QL NAA+PROBE: NORMAL SCORE
C ALBICANS DNA VAG QL NAA+PROBE: NEGATIVE
C GLABRATA DNA VAG QL NAA+PROBE: NEGATIVE
C TRACH RRNA SPEC QL NAA+PROBE: NEGATIVE
MEGA1 DNA VAG QL NAA+PROBE: NORMAL SCORE
N GONORRHOEA RRNA SPEC QL NAA+PROBE: NEGATIVE
SPECIMEN SOURCE: NORMAL
T VAGINALIS RRNA SPEC QL NAA+PROBE: NEGATIVE

## 2023-06-16 ENCOUNTER — HOSPITAL ENCOUNTER (OUTPATIENT)
Dept: CT IMAGING | Age: 63
Discharge: HOME | End: 2023-06-19
Payer: COMMERCIAL

## 2023-06-16 DIAGNOSIS — R91.8 LUNG MASS: ICD-10-CM

## 2023-06-16 DIAGNOSIS — R93.89 ABNORMAL CHEST CT: ICD-10-CM

## 2023-06-16 DIAGNOSIS — J47.9 BRONCHIECTASIS WITHOUT COMPLICATION (HCC): ICD-10-CM

## 2023-06-16 DIAGNOSIS — R91.1 PULMONARY NODULE: ICD-10-CM

## 2023-06-16 PROCEDURE — 71250 CT THORAX DX C-: CPT

## 2023-06-23 ENCOUNTER — TELEPHONE (OUTPATIENT)
Dept: PULMONOLOGY | Age: 63
End: 2023-06-23

## 2023-06-23 DIAGNOSIS — R91.8 LUNG MASS: ICD-10-CM

## 2023-06-23 DIAGNOSIS — R93.89 ABNORMAL CHEST CT: ICD-10-CM

## 2023-06-23 DIAGNOSIS — J47.9 BRONCHIECTASIS WITHOUT COMPLICATION (HCC): ICD-10-CM

## 2023-06-23 LAB
CRP SERPL-MCNC: <0.3 MG/DL (ref 0–0.9)
ERYTHROCYTE [SEDIMENTATION RATE] IN BLOOD: 8 MM/HR (ref 0–30)
RHEUMATOID FACT SER QL LA: NEGATIVE

## 2023-06-24 LAB
ANA SER QL: NEGATIVE
ENA SCL70 AB SER-ACNC: <0.2 AI (ref 0–0.9)

## 2023-06-25 LAB — AT III PPP CHRO-ACNC: 144 % (ref 75–135)

## 2023-06-28 ENCOUNTER — OFFICE VISIT (OUTPATIENT)
Dept: OBGYN CLINIC | Age: 63
End: 2023-06-28
Payer: COMMERCIAL

## 2023-06-28 VITALS — SYSTOLIC BLOOD PRESSURE: 110 MMHG | BODY MASS INDEX: 18.71 KG/M2 | DIASTOLIC BLOOD PRESSURE: 78 MMHG | WEIGHT: 109 LBS

## 2023-06-28 DIAGNOSIS — N95.2 VAGINAL ATROPHY: ICD-10-CM

## 2023-06-28 DIAGNOSIS — Z12.31 SCREENING MAMMOGRAM FOR BREAST CANCER: ICD-10-CM

## 2023-06-28 DIAGNOSIS — N95.0 PMB (POSTMENOPAUSAL BLEEDING): Primary | ICD-10-CM

## 2023-06-28 PROCEDURE — 99213 OFFICE O/P EST LOW 20 MIN: CPT | Performed by: NURSE PRACTITIONER

## 2023-06-28 PROCEDURE — 76856 US EXAM PELVIC COMPLETE: CPT | Performed by: NURSE PRACTITIONER

## 2023-07-11 ENCOUNTER — NURSE ONLY (OUTPATIENT)
Dept: INTERNAL MEDICINE CLINIC | Facility: CLINIC | Age: 63
End: 2023-07-11
Payer: COMMERCIAL

## 2023-07-11 DIAGNOSIS — E53.8 B12 DEFICIENCY: Primary | ICD-10-CM

## 2023-07-11 PROBLEM — N94.9 VAGINAL BURNING: Status: ACTIVE | Noted: 2023-06-07

## 2023-07-11 PROBLEM — N94.89 VAGINAL BURNING: Status: ACTIVE | Noted: 2023-06-07

## 2023-07-11 PROCEDURE — 96372 THER/PROPH/DIAG INJ SC/IM: CPT | Performed by: FAMILY MEDICINE

## 2023-07-11 RX ORDER — CYANOCOBALAMIN 1000 UG/ML
1000 INJECTION, SOLUTION INTRAMUSCULAR; SUBCUTANEOUS ONCE
Status: COMPLETED | OUTPATIENT
Start: 2023-07-11 | End: 2023-07-11

## 2023-07-11 RX ADMIN — CYANOCOBALAMIN 1000 MCG: 1000 INJECTION, SOLUTION INTRAMUSCULAR; SUBCUTANEOUS at 08:49

## 2023-07-12 ENCOUNTER — TELEPHONE (OUTPATIENT)
Dept: PULMONOLOGY | Age: 63
End: 2023-07-12

## 2023-07-12 NOTE — TELEPHONE ENCOUNTER
The patient is taking prednisone and wants to know if the dose she is taking is too much, it is making her jittery and nervous.

## 2023-07-13 NOTE — TELEPHONE ENCOUNTER
Spoke with the patient and explained per Meri FRY that she needs to continue with the current dose until she is seen in 2 weeks. I explained that Paloma Orantes recommends to break up the dose into the 2 if she needs to take 1 pill in the morning and one in the afternoon to see if that helps instead of 2 pills at the same time. Patient understood and stated that she will try this and will call us if she is having any further issues. No further questions or concerns were asked at this time.  // Marily Barba

## 2023-07-17 ENCOUNTER — OFFICE VISIT (OUTPATIENT)
Dept: INTERNAL MEDICINE CLINIC | Facility: CLINIC | Age: 63
End: 2023-07-17
Payer: COMMERCIAL

## 2023-07-17 VITALS
DIASTOLIC BLOOD PRESSURE: 70 MMHG | RESPIRATION RATE: 16 BRPM | SYSTOLIC BLOOD PRESSURE: 110 MMHG | HEIGHT: 64 IN | OXYGEN SATURATION: 99 % | WEIGHT: 111 LBS | BODY MASS INDEX: 18.95 KG/M2 | HEART RATE: 68 BPM

## 2023-07-17 DIAGNOSIS — J45.20 MILD INTERMITTENT ASTHMA WITHOUT COMPLICATION: Primary | ICD-10-CM

## 2023-07-17 DIAGNOSIS — J47.9 BRONCHIECTASIS, UNCOMPLICATED (HCC): ICD-10-CM

## 2023-07-17 DIAGNOSIS — K21.9 GASTROESOPHAGEAL REFLUX DISEASE WITHOUT ESOPHAGITIS: ICD-10-CM

## 2023-07-17 DIAGNOSIS — F41.0 PANIC DISORDER WITHOUT AGORAPHOBIA: ICD-10-CM

## 2023-07-17 DIAGNOSIS — M85.89 OSTEOPENIA OF MULTIPLE SITES: ICD-10-CM

## 2023-07-17 PROCEDURE — 99214 OFFICE O/P EST MOD 30 MIN: CPT | Performed by: FAMILY MEDICINE

## 2023-07-17 RX ORDER — FAMOTIDINE 20 MG/1
20 TABLET, FILM COATED ORAL 2 TIMES DAILY PRN
Qty: 180 TABLET | Refills: 1 | Status: SHIPPED | OUTPATIENT
Start: 2023-07-17

## 2023-07-17 RX ORDER — LORAZEPAM 0.5 MG/1
0.5 TABLET ORAL DAILY PRN
Qty: 30 TABLET | Refills: 2 | Status: SHIPPED | OUTPATIENT
Start: 2023-07-17 | End: 2023-10-15

## 2023-07-17 RX ORDER — FLUTICASONE PROPIONATE 50 MCG
2 SPRAY, SUSPENSION (ML) NASAL DAILY
Qty: 16 G | Refills: 5 | Status: CANCELLED | OUTPATIENT
Start: 2023-07-17

## 2023-07-17 RX ORDER — FLUTICASONE FUROATE AND VILANTEROL 100; 25 UG/1; UG/1
1 POWDER RESPIRATORY (INHALATION) DAILY
Qty: 1 EACH | Refills: 5 | Status: SHIPPED | OUTPATIENT
Start: 2023-07-17

## 2023-07-24 ENCOUNTER — TELEPHONE (OUTPATIENT)
Dept: PULMONOLOGY | Age: 63
End: 2023-07-24

## 2023-07-24 ENCOUNTER — HOSPITAL ENCOUNTER (OUTPATIENT)
Dept: MAMMOGRAPHY | Age: 63
Discharge: HOME | End: 2023-07-27
Attending: FAMILY MEDICINE
Payer: COMMERCIAL

## 2023-07-24 DIAGNOSIS — M85.89 OSTEOPENIA OF MULTIPLE SITES: ICD-10-CM

## 2023-07-24 PROCEDURE — 77080 DXA BONE DENSITY AXIAL: CPT

## 2023-07-24 NOTE — TELEPHONE ENCOUNTER
Received call that PA for CT needed Peer to Peer. Additional information given, CT approved.      Auth # [id number]  Valid until 8/20/2023 /dd

## 2023-07-26 ENCOUNTER — HOSPITAL ENCOUNTER (OUTPATIENT)
Dept: CT IMAGING | Age: 63
Discharge: HOME | End: 2023-07-29
Payer: COMMERCIAL

## 2023-07-26 DIAGNOSIS — J47.9 BRONCHIECTASIS WITHOUT COMPLICATION (HCC): ICD-10-CM

## 2023-07-26 DIAGNOSIS — R93.89 ABNORMAL CHEST CT: ICD-10-CM

## 2023-07-26 DIAGNOSIS — R91.8 LUNG MASS: ICD-10-CM

## 2023-07-26 PROCEDURE — 71250 CT THORAX DX C-: CPT

## 2023-07-27 NOTE — PROGRESS NOTES
follow-up exam.    6/16/23        Nuclear Medicine: No results found for this or any previous visit from the past 3650 days. PFTs:   No flowsheet data found. No results found for this or any previous visit. No results found for this or any previous visit. FeNO: No results found for this or any previous visit. FeNO and Likelihood of Eosinophilic Asthma   Unlikely Intermediate Likely   <25 ppb 25-50 ppb >50ppb     Exercise Oximetry:    Echo: No results found for this or any previous visit from the past 3650 days. Immunization History   Administered Date(s) Administered    Pneumococcal Vaccine 02/22/2008    Pneumococcal, PPSV23, PNEUMOVAX 21, (age 2y+), SC/IM, 0.5mL 02/22/2008    Td vaccine (adult) 01/01/2006     Past Medical History:   Diagnosis Date    Anemia     Anxiety disorder     B12 deficiency     Bronchiectasis without complication ([address]) 43/8/1458    Depression     Gastroesophageal reflux     Hashimoto's disease 8/16/2017    pt denies (3/15/2023)    Hx of blood clots     resolved, could not tolerate meds to treat; resolved with \"compresses\".  Happened \"years ago\"per pt    Irritable bowel syndrome     Irritable bowel syndrome with diarrhea     Menopause     50s    Mild intermittent asthma without complication 87/2/2691    Osteopenia         Tobacco Use      Smoking status: Never      Smokeless tobacco: Never    Allergies   Allergen Reactions    Penicillins Other (See Comments) and Rash    Aspirin Other (See Comments)     Makes her \"heart beat too fast\"    Bupropion Other (See Comments)     Tremors, tachycardia    Escitalopram Other (See Comments)    Hydroxyzine Hcl Other (See Comments)    Levofloxacin Other (See Comments)    Loratadine Other (See Comments)     sleepy    Methylprednisolone Acetate Other (See Comments)     Light headed/anxious    Pantoprazole Hives    Sertraline Other (See Comments)     Felt spacy, but will be willing to try again    Sulfa Antibiotics Other (See Comments)

## 2023-07-28 ENCOUNTER — OFFICE VISIT (OUTPATIENT)
Dept: PULMONOLOGY | Age: 63
End: 2023-07-28
Payer: COMMERCIAL

## 2023-07-28 VITALS
HEART RATE: 72 BPM | OXYGEN SATURATION: 100 % | HEIGHT: 63 IN | RESPIRATION RATE: 14 BRPM | DIASTOLIC BLOOD PRESSURE: 92 MMHG | BODY MASS INDEX: 19.14 KG/M2 | WEIGHT: 108 LBS | SYSTOLIC BLOOD PRESSURE: 170 MMHG

## 2023-07-28 DIAGNOSIS — J47.9 BRONCHIECTASIS WITHOUT COMPLICATION (HCC): ICD-10-CM

## 2023-07-28 DIAGNOSIS — J45.20 MILD INTERMITTENT ASTHMA WITHOUT COMPLICATION: ICD-10-CM

## 2023-07-28 DIAGNOSIS — R91.1 PULMONARY NODULE: ICD-10-CM

## 2023-07-28 DIAGNOSIS — R93.89 ABNORMAL CHEST CT: Primary | ICD-10-CM

## 2023-07-28 PROCEDURE — 99214 OFFICE O/P EST MOD 30 MIN: CPT | Performed by: PHYSICIAN ASSISTANT

## 2023-07-28 RX ORDER — PREDNISONE 10 MG/1
TABLET ORAL
Qty: 210 TABLET | Refills: 0 | Status: SHIPPED | OUTPATIENT
Start: 2023-07-28

## 2023-07-28 NOTE — PATIENT INSTRUCTIONS
You will cut down to prednisone 30mg daily for one month, then 20mg daily for one month, then 10mg daily for one month. You will then need to be off of the steroids for 2 months and we will repeat a CT scan. Your dosing will look like:   Prednisone 10mg tabs -3 tabs per day for one month, then 2 tabs per day for one month, then 1 tab per day for one month.

## 2023-08-04 ENCOUNTER — OFFICE VISIT (OUTPATIENT)
Dept: INTERNAL MEDICINE CLINIC | Facility: CLINIC | Age: 63
End: 2023-08-04
Payer: COMMERCIAL

## 2023-08-04 VITALS
BODY MASS INDEX: 18.85 KG/M2 | WEIGHT: 110.4 LBS | SYSTOLIC BLOOD PRESSURE: 150 MMHG | HEIGHT: 64 IN | HEART RATE: 86 BPM | OXYGEN SATURATION: 98 % | DIASTOLIC BLOOD PRESSURE: 98 MMHG

## 2023-08-04 DIAGNOSIS — R03.0 ELEVATED BLOOD PRESSURE READING: ICD-10-CM

## 2023-08-04 DIAGNOSIS — M81.0 AGE-RELATED OSTEOPOROSIS WITHOUT CURRENT PATHOLOGICAL FRACTURE: Primary | ICD-10-CM

## 2023-08-04 PROCEDURE — 99213 OFFICE O/P EST LOW 20 MIN: CPT | Performed by: FAMILY MEDICINE

## 2023-08-04 RX ORDER — ALENDRONATE SODIUM 70 MG/1
70 TABLET ORAL
Qty: 12 TABLET | Refills: 1 | Status: SHIPPED | OUTPATIENT
Start: 2023-08-04

## 2023-08-04 ASSESSMENT — PATIENT HEALTH QUESTIONNAIRE - PHQ9
SUM OF ALL RESPONSES TO PHQ QUESTIONS 1-9: 0
1. LITTLE INTEREST OR PLEASURE IN DOING THINGS: 0
SUM OF ALL RESPONSES TO PHQ QUESTIONS 1-9: 0
SUM OF ALL RESPONSES TO PHQ QUESTIONS 1-9: 0
2. FEELING DOWN, DEPRESSED OR HOPELESS: 0
SUM OF ALL RESPONSES TO PHQ QUESTIONS 1-9: 0
SUM OF ALL RESPONSES TO PHQ9 QUESTIONS 1 & 2: 0

## 2023-08-07 ENCOUNTER — TELEPHONE (OUTPATIENT)
Dept: INTERNAL MEDICINE CLINIC | Facility: CLINIC | Age: 63
End: 2023-08-07

## 2023-08-07 ENCOUNTER — OFFICE VISIT (OUTPATIENT)
Dept: INTERNAL MEDICINE CLINIC | Facility: CLINIC | Age: 63
End: 2023-08-07
Payer: COMMERCIAL

## 2023-08-07 VITALS
HEART RATE: 76 BPM | RESPIRATION RATE: 16 BRPM | BODY MASS INDEX: 18.78 KG/M2 | SYSTOLIC BLOOD PRESSURE: 148 MMHG | HEIGHT: 64 IN | WEIGHT: 110 LBS | OXYGEN SATURATION: 97 % | DIASTOLIC BLOOD PRESSURE: 80 MMHG

## 2023-08-07 DIAGNOSIS — F51.04 PSYCHOPHYSIOLOGICAL INSOMNIA: ICD-10-CM

## 2023-08-07 DIAGNOSIS — J98.4 CAVITATING MASS OF LUNG: ICD-10-CM

## 2023-08-07 DIAGNOSIS — R03.0 ELEVATED BLOOD PRESSURE READING: ICD-10-CM

## 2023-08-07 DIAGNOSIS — M81.0 AGE-RELATED OSTEOPOROSIS WITHOUT CURRENT PATHOLOGICAL FRACTURE: Primary | ICD-10-CM

## 2023-08-07 PROCEDURE — 99213 OFFICE O/P EST LOW 20 MIN: CPT | Performed by: FAMILY MEDICINE

## 2023-08-07 NOTE — TELEPHONE ENCOUNTER
Wants to talk with him or his nurse more about the dexa information that he gave her last week still has some ?'s

## 2023-08-07 NOTE — PROGRESS NOTES
Patrica Matthew DO  Diplomate of the [employer] [address] Internal Medicine      Pete Camargo (: 1960) is a 60 y.o. female, here for evaluation of the following chief complaint(s):  Results (Bone density /)       ASSESSMENT/PLAN:  1. Age-related osteoporosis without current pathological fracture  2. Cavitating mass of lung  3. Psychophysiological insomnia  4. Elevated blood pressure reading       -Spent 15 minutes discussing osteoporosis and the treatment again. She is willing to try the Fosamax, so she will go ahead and get this started. I did instruct her on how to take it properly. Also providing resources on calcium rich food. Continue vitamin D supplementation.  -Prednisone is slowly being weaned per pulmonology. I encouraged her to continue follow-up.  -Discussed that she may take lorazepam on a as needed basis for severe insomnia. Patient is aware of the risks associated with the medication.  -Blood pressure most likely elevated due to anxiety/steroids. Will continue to monitor and encourage home monitoring. If remains elevated, will start therapy. SUBJECTIVE/OBJECTIVE:  HPI:  -Patient comes in today for follow-up regarding her bone density again. She has more questions regarding this. She has questions about the Fosamax as well.  -She continues on prednisone. This is for the cavitating mass of her lung. She feels extremely anxious due to this and jittery. Breathing improved.  -Has been having a lot of problems sleeping as well due to the prednisone. She does have lorazepam at home but has not really been taking. ROS negative except as noted above today.     Social History     Tobacco Use    Smoking status: Never     Passive exposure: Never    Smokeless tobacco: Never   Vaping Use    Vaping Use: Never used   Substance Use Topics    Alcohol use: No    Drug use: No     Vitals:    23 1454   BP: (!) 148/80   Site: Left Upper Arm   Position:

## 2023-08-15 ENCOUNTER — NURSE ONLY (OUTPATIENT)
Dept: INTERNAL MEDICINE CLINIC | Facility: CLINIC | Age: 63
End: 2023-08-15
Payer: COMMERCIAL

## 2023-08-15 DIAGNOSIS — E53.8 B12 DEFICIENCY: Primary | ICD-10-CM

## 2023-08-15 PROCEDURE — 96372 THER/PROPH/DIAG INJ SC/IM: CPT | Performed by: FAMILY MEDICINE

## 2023-08-15 RX ORDER — CYANOCOBALAMIN 1000 UG/ML
1000 INJECTION, SOLUTION INTRAMUSCULAR; SUBCUTANEOUS ONCE
Status: COMPLETED | OUTPATIENT
Start: 2023-08-15 | End: 2023-08-15

## 2023-08-15 RX ADMIN — CYANOCOBALAMIN 1000 MCG: 1000 INJECTION, SOLUTION INTRAMUSCULAR; SUBCUTANEOUS at 08:54

## 2023-09-15 ENCOUNTER — NURSE ONLY (OUTPATIENT)
Dept: INTERNAL MEDICINE CLINIC | Facility: CLINIC | Age: 63
End: 2023-09-15
Payer: COMMERCIAL

## 2023-09-15 DIAGNOSIS — E53.8 B12 DEFICIENCY: Primary | ICD-10-CM

## 2023-09-15 PROCEDURE — 96372 THER/PROPH/DIAG INJ SC/IM: CPT | Performed by: FAMILY MEDICINE

## 2023-09-15 RX ORDER — CYANOCOBALAMIN 1000 UG/ML
1000 INJECTION, SOLUTION INTRAMUSCULAR; SUBCUTANEOUS ONCE
Status: COMPLETED | OUTPATIENT
Start: 2023-09-15 | End: 2023-09-15

## 2023-09-15 RX ADMIN — CYANOCOBALAMIN 1000 MCG: 1000 INJECTION, SOLUTION INTRAMUSCULAR; SUBCUTANEOUS at 08:55

## 2023-09-20 ENCOUNTER — OFFICE VISIT (OUTPATIENT)
Dept: INTERNAL MEDICINE CLINIC | Facility: CLINIC | Age: 63
End: 2023-09-20
Payer: COMMERCIAL

## 2023-09-20 VITALS
WEIGHT: 124 LBS | HEART RATE: 70 BPM | RESPIRATION RATE: 16 BRPM | SYSTOLIC BLOOD PRESSURE: 130 MMHG | OXYGEN SATURATION: 98 % | HEIGHT: 64 IN | BODY MASS INDEX: 21.17 KG/M2 | DIASTOLIC BLOOD PRESSURE: 68 MMHG

## 2023-09-20 DIAGNOSIS — B37.0 ORAL THRUSH: ICD-10-CM

## 2023-09-20 DIAGNOSIS — J47.9 BRONCHIECTASIS WITHOUT COMPLICATION (HCC): ICD-10-CM

## 2023-09-20 DIAGNOSIS — J45.20 MILD INTERMITTENT ASTHMA WITHOUT COMPLICATION: ICD-10-CM

## 2023-09-20 DIAGNOSIS — H65.04 RECURRENT ACUTE SEROUS OTITIS MEDIA OF RIGHT EAR: Primary | ICD-10-CM

## 2023-09-20 LAB
ANION GAP SERPL CALC-SCNC: 6 MMOL/L (ref 2–11)
BUN SERPL-MCNC: 22 MG/DL (ref 8–23)
CALCIUM SERPL-MCNC: 8.8 MG/DL (ref 8.3–10.4)
CHLORIDE SERPL-SCNC: 107 MMOL/L (ref 101–110)
CO2 SERPL-SCNC: 27 MMOL/L (ref 21–32)
CREAT SERPL-MCNC: 0.7 MG/DL (ref 0.6–1)
GLUCOSE SERPL-MCNC: 106 MG/DL (ref 65–100)
POTASSIUM SERPL-SCNC: 3.8 MMOL/L (ref 3.5–5.1)
SODIUM SERPL-SCNC: 140 MMOL/L (ref 133–143)

## 2023-09-20 PROCEDURE — 99213 OFFICE O/P EST LOW 20 MIN: CPT | Performed by: FAMILY MEDICINE

## 2023-09-20 NOTE — PROGRESS NOTES
Candelaria Hindu, DO  Diplomate of the [employer] [address] Internal Medicine      Benito Otoole (: 1960) is a 58 y.o. female, here for evaluation of the following chief complaint(s):  Ear Fullness (Right sided) and Medication Problem (Breo \"drying\" pt out)       ASSESSMENT/PLAN:  1. Recurrent acute serous otitis media of right ear  -     [address] [facility], [place]  2. Oral thrush  -     nystatin (MYCOSTATIN) 009894 UNIT/ML suspension; Take 5 mLs by mouth 4 times daily swish and spit, Oral, 4 TIMES DAILY Starting Wed 2023, Disp-140 mL, R-2, Normal  3. Bronchiectasis without complication ([address])  -     Basic Metabolic Panel; Future  4. Mild intermittent asthma without complication     -Will start Flonase. Demonstrated proper nasal spray technique with specific instruction to avoid the nasal septum. -ENT referral if not improving--already on steroids.  -Nystatin swish n spite and encouraged rinsing of mouth after using Breo. -Check glucose due to chronic steroids.  -Follow up with Pulmonology as scheduled. SUBJECTIVE/OBJECTIVE:  HPI:  -Patient comes in with chief complaint of having right ear pain and pressure. She has had this previously and in the past has used Flonase and it has improved. She is currently not using Flonase but she is on chronic steroid therapy due to bronchiectasis.  -Has also developed white coating on tongue and tenderness.  -Having dry mouth due to Breo, no purulent productive cough/SOB/wheezing. ROS negative except as noted above today.     Social History     Tobacco Use    Smoking status: Never     Passive exposure: Never    Smokeless tobacco: Never   Vaping Use    Vaping Use: Never used   Substance Use Topics    Alcohol use: No    Drug use: No     Vitals:    23 1419   BP: 130/68   Site: Left Upper Arm   Position: Sitting   Pulse: 70   Resp: 16   SpO2: 98%   Weight: 124 lb (56.2 kg)   Height: 5' 4\" (1.626 m)      Body mass index

## 2023-09-28 ENCOUNTER — TELEPHONE (OUTPATIENT)
Dept: PULMONOLOGY | Age: 63
End: 2023-09-28

## 2023-09-28 NOTE — TELEPHONE ENCOUNTER
Last seen: 7/28/23  Hx: asthma, bronchiectasis, cavitating nodule    Having side effects r/t Breo including hoarsness, dry & raw mouth. Oral thrush tx from PCP on 9/20, including recurrent ear infection & chronic need for steroids. Asking for alternative medication to [facility].

## 2023-09-28 NOTE — TELEPHONE ENCOUNTER
Patient says that Dr. Stefan Jensen told her that since she see's pulmonary that her inhaler should be changed by one of our providers       Disp Refills Start End    fluticasone furoate-vilanterol (BREO ELLIPTA) 100-25 MCG/ACT inhaler            She says that it is making her hoarse, mouth and throat raw. Drying her mouth and making white spots in her mouth .      She says it really helps but the side affects she can't handle

## 2023-09-29 NOTE — TELEPHONE ENCOUNTER
Patient is calling back to see if anything has been found out about the [facility]

## 2023-10-02 RX ORDER — TIOTROPIUM BROMIDE INHALATION SPRAY 1.56 UG/1
2 SPRAY, METERED RESPIRATORY (INHALATION) DAILY
Qty: 1 EACH | Refills: 11 | Status: SHIPPED | OUTPATIENT
Start: 2023-10-02

## 2023-10-02 NOTE — TELEPHONE ENCOUNTER
My guess would be that the thrush is likely from all of the prednisone she has been on rather than the breo, however, she can try spiriva respimat 1.25mg in place of breo to see if this works ok for her. I will send a new prescription for spiriva now. Please make pt aware of the above message.

## 2023-10-05 ENCOUNTER — OFFICE VISIT (OUTPATIENT)
Dept: ENT CLINIC | Age: 63
End: 2023-10-05
Payer: COMMERCIAL

## 2023-10-05 VITALS — HEIGHT: 63 IN | BODY MASS INDEX: 22.86 KG/M2 | WEIGHT: 129 LBS

## 2023-10-05 DIAGNOSIS — M26.621 ARTHRALGIA OF RIGHT TEMPOROMANDIBULAR JOINT: Primary | ICD-10-CM

## 2023-10-05 PROCEDURE — 99244 OFF/OP CNSLTJ NEW/EST MOD 40: CPT | Performed by: OTOLARYNGOLOGY

## 2023-10-05 NOTE — PROGRESS NOTES
Mathew Barton MD FARS  [address] [facility], [address] [place] [address]  P: [phone]          OFFICE VISIT       10/5/2023    Chief Complaint   Patient presents with    New Patient    Ear Problem     Right ear       HPI:  Lorrie Abdi is a 58 y.o. female seen in consultation today at the request of Dr. Charline Michelle for   Chief Complaint   Patient presents with    New Patient    Ear Problem     Right ear   . Onset of symptoms: 3 weeks  Frequency (daily, weekly,every night, every morning, constant): daily  Alleviating factors or medications: flonase  Associated with pain and if so scale (1-10): 1  Is condition becoming progressively worse: improving  Associated symptoms within upper aerodigestive tract: Cheek pain from accidental chewing of buccal mucosa. Current Outpatient Medications:     tiotropium (SPIRIVA RESPIMAT) 1.25 MCG/ACT AERS inhaler, Inhale 2 puffs into the lungs daily, Disp: 1 each, Rfl: 11    nystatin (MYCOSTATIN) [id number] UNIT/ML suspension, Take 5 mLs by mouth 4 times daily swish and spit, Disp: 140 mL, Rfl: 2    alendronate (FOSAMAX) 70 MG tablet, Take 1 tablet by mouth every 7 days, Disp: 12 tablet, Rfl: 1    predniSONE (DELTASONE) 10 MG tablet, Pt to take 3 tabs daily x 1 month, then 2 tabs daily x 1 month, then 1 tab x 1 month, then stop, Disp: 210 tablet, Rfl: 0    famotidine (PEPCID) 20 MG tablet, Take 1 tablet by mouth 2 times daily as needed (Indigestion), Disp: 180 tablet, Rfl: 1    fluticasone furoate-vilanterol (BREO ELLIPTA) 100-25 MCG/ACT inhaler, Inhale 1 puff into the lungs daily, Disp: 1 each, Rfl: 5    LORazepam (ATIVAN) 0.5 MG tablet, Take 1 tablet by mouth daily as needed for Anxiety for up to 90 days.  Max Daily Amount: 0.5 mg, Disp: 30 tablet, Rfl: 2    diclofenac sodium (VOLTAREN) 1 % GEL, Apply 4 g topically 4 times daily, Disp: 100 g, Rfl: 1    phenazopyridine (PYRIDIUM) 100 MG tablet, , Disp: , Rfl:     estradiol (ESTRACE) 0.1 MG/GM vaginal cream,

## 2023-10-16 DIAGNOSIS — F41.0 PANIC DISORDER WITHOUT AGORAPHOBIA: Primary | ICD-10-CM

## 2023-10-17 ENCOUNTER — NURSE ONLY (OUTPATIENT)
Dept: INTERNAL MEDICINE CLINIC | Facility: CLINIC | Age: 63
End: 2023-10-17
Payer: COMMERCIAL

## 2023-10-17 DIAGNOSIS — E53.8 B12 DEFICIENCY: Primary | ICD-10-CM

## 2023-10-17 PROCEDURE — 96372 THER/PROPH/DIAG INJ SC/IM: CPT | Performed by: FAMILY MEDICINE

## 2023-10-17 RX ORDER — LORAZEPAM 0.5 MG/1
0.5 TABLET ORAL EVERY 6 HOURS PRN
Qty: 30 TABLET | Refills: 2 | Status: SHIPPED | OUTPATIENT
Start: 2023-10-17 | End: 2024-01-15

## 2023-10-17 RX ORDER — CYANOCOBALAMIN 1000 UG/ML
1000 INJECTION, SOLUTION INTRAMUSCULAR; SUBCUTANEOUS ONCE
Status: COMPLETED | OUTPATIENT
Start: 2023-10-17 | End: 2023-10-17

## 2023-10-17 RX ADMIN — CYANOCOBALAMIN 1000 MCG: 1000 INJECTION, SOLUTION INTRAMUSCULAR; SUBCUTANEOUS at 09:05

## 2023-11-13 ENCOUNTER — OFFICE VISIT (OUTPATIENT)
Dept: PULMONOLOGY | Age: 63
End: 2023-11-13
Payer: COMMERCIAL

## 2023-11-13 VITALS
RESPIRATION RATE: 18 BRPM | OXYGEN SATURATION: 96 % | TEMPERATURE: 98.1 F | BODY MASS INDEX: 23.21 KG/M2 | SYSTOLIC BLOOD PRESSURE: 126 MMHG | HEART RATE: 71 BPM | HEIGHT: 63 IN | DIASTOLIC BLOOD PRESSURE: 79 MMHG | WEIGHT: 131 LBS

## 2023-11-13 DIAGNOSIS — J45.909 UNCOMPLICATED ASTHMA, UNSPECIFIED ASTHMA SEVERITY, UNSPECIFIED WHETHER PERSISTENT: ICD-10-CM

## 2023-11-13 DIAGNOSIS — R93.89 ABNORMAL CHEST CT: Primary | ICD-10-CM

## 2023-11-13 DIAGNOSIS — R91.1 LUNG NODULE: ICD-10-CM

## 2023-11-13 PROCEDURE — 99214 OFFICE O/P EST MOD 30 MIN: CPT | Performed by: INTERNAL MEDICINE

## 2023-11-17 ENCOUNTER — NURSE ONLY (OUTPATIENT)
Dept: INTERNAL MEDICINE CLINIC | Facility: CLINIC | Age: 63
End: 2023-11-17
Payer: COMMERCIAL

## 2023-11-17 ENCOUNTER — OFFICE VISIT (OUTPATIENT)
Dept: INTERNAL MEDICINE CLINIC | Facility: CLINIC | Age: 63
End: 2023-11-17
Payer: COMMERCIAL

## 2023-11-17 VITALS
HEIGHT: 63 IN | HEART RATE: 72 BPM | SYSTOLIC BLOOD PRESSURE: 130 MMHG | WEIGHT: 130 LBS | RESPIRATION RATE: 16 BRPM | BODY MASS INDEX: 23.04 KG/M2 | DIASTOLIC BLOOD PRESSURE: 70 MMHG | OXYGEN SATURATION: 98 %

## 2023-11-17 DIAGNOSIS — M81.0 AGE-RELATED OSTEOPOROSIS WITHOUT CURRENT PATHOLOGICAL FRACTURE: ICD-10-CM

## 2023-11-17 DIAGNOSIS — E53.8 B12 DEFICIENCY: Primary | ICD-10-CM

## 2023-11-17 DIAGNOSIS — J45.20 MILD INTERMITTENT ASTHMA WITHOUT COMPLICATION: ICD-10-CM

## 2023-11-17 DIAGNOSIS — L60.3 DYSTROPHY OF NAIL DUE TO TRAUMA: Primary | ICD-10-CM

## 2023-11-17 PROCEDURE — 99214 OFFICE O/P EST MOD 30 MIN: CPT | Performed by: FAMILY MEDICINE

## 2023-11-17 RX ORDER — CYANOCOBALAMIN 1000 UG/ML
1000 INJECTION, SOLUTION INTRAMUSCULAR; SUBCUTANEOUS ONCE
Status: COMPLETED | OUTPATIENT
Start: 2023-11-17 | End: 2023-11-17

## 2023-11-17 RX ADMIN — CYANOCOBALAMIN 1000 MCG: 1000 INJECTION, SOLUTION INTRAMUSCULAR; SUBCUTANEOUS at 08:59

## 2023-12-01 ENCOUNTER — HOSPITAL ENCOUNTER (OUTPATIENT)
Dept: CT IMAGING | Age: 63
Discharge: HOME | End: 2023-12-01
Attending: INTERNAL MEDICINE
Payer: COMMERCIAL

## 2023-12-01 DIAGNOSIS — R93.89 ABNORMAL CHEST CT: ICD-10-CM

## 2023-12-01 DIAGNOSIS — R91.1 LUNG NODULE: ICD-10-CM

## 2023-12-01 PROCEDURE — 71250 CT THORAX DX C-: CPT

## 2024-01-17 ENCOUNTER — OFFICE VISIT (OUTPATIENT)
Dept: INTERNAL MEDICINE CLINIC | Facility: CLINIC | Age: 64
End: 2024-01-17
Payer: COMMERCIAL

## 2024-01-17 VITALS
HEIGHT: 63 IN | RESPIRATION RATE: 16 BRPM | WEIGHT: 127 LBS | BODY MASS INDEX: 22.5 KG/M2 | HEART RATE: 70 BPM | SYSTOLIC BLOOD PRESSURE: 136 MMHG | DIASTOLIC BLOOD PRESSURE: 78 MMHG | OXYGEN SATURATION: 99 %

## 2024-01-17 DIAGNOSIS — E53.8 B12 DEFICIENCY: ICD-10-CM

## 2024-01-17 DIAGNOSIS — N30.00 ACUTE CYSTITIS WITHOUT HEMATURIA: Primary | ICD-10-CM

## 2024-01-17 DIAGNOSIS — M81.0 AGE-RELATED OSTEOPOROSIS WITHOUT CURRENT PATHOLOGICAL FRACTURE: ICD-10-CM

## 2024-01-17 DIAGNOSIS — R04.0 EPISTAXIS: Primary | ICD-10-CM

## 2024-01-17 DIAGNOSIS — F41.0 PANIC DISORDER WITHOUT AGORAPHOBIA: ICD-10-CM

## 2024-01-17 DIAGNOSIS — R30.0 DYSURIA: ICD-10-CM

## 2024-01-17 DIAGNOSIS — E78.2 MIXED HYPERLIPIDEMIA: ICD-10-CM

## 2024-01-17 DIAGNOSIS — K21.9 GASTROESOPHAGEAL REFLUX DISEASE WITHOUT ESOPHAGITIS: ICD-10-CM

## 2024-01-17 DIAGNOSIS — Z12.31 SCREENING MAMMOGRAM FOR BREAST CANCER: ICD-10-CM

## 2024-01-17 DIAGNOSIS — J45.20 MILD INTERMITTENT ASTHMA WITHOUT COMPLICATION: ICD-10-CM

## 2024-01-17 DIAGNOSIS — J47.9 BRONCHIECTASIS WITHOUT COMPLICATION (HCC): ICD-10-CM

## 2024-01-17 DIAGNOSIS — N95.2 VAGINAL ATROPHY: ICD-10-CM

## 2024-01-17 PROBLEM — N89.8 VAGINAL DISCHARGE: Status: RESOLVED | Noted: 2023-06-07 | Resolved: 2024-01-17

## 2024-01-17 PROBLEM — N94.9 VAGINAL BURNING: Status: RESOLVED | Noted: 2023-06-07 | Resolved: 2024-01-17

## 2024-01-17 PROBLEM — N94.89 VAGINAL BURNING: Status: RESOLVED | Noted: 2023-06-07 | Resolved: 2024-01-17

## 2024-01-17 PROBLEM — R91.8 LUNG MASS: Status: RESOLVED | Noted: 2023-03-28 | Resolved: 2024-01-17

## 2024-01-17 LAB
ALBUMIN SERPL-MCNC: 4.1 G/DL (ref 3.2–4.6)
ALBUMIN/GLOB SERPL: 1.4 (ref 0.4–1.6)
ALP SERPL-CCNC: 69 U/L (ref 50–136)
ALT SERPL-CCNC: 19 U/L (ref 12–65)
ANION GAP SERPL CALC-SCNC: 2 MMOL/L (ref 2–11)
APPEARANCE UR: ABNORMAL
AST SERPL-CCNC: 18 U/L (ref 15–37)
BACTERIA URNS QL MICRO: ABNORMAL /HPF
BILIRUB DIRECT SERPL-MCNC: 0.3 MG/DL
BILIRUB SERPL-MCNC: 1.7 MG/DL (ref 0.2–1.1)
BILIRUB UR QL: ABNORMAL
BUN SERPL-MCNC: 13 MG/DL (ref 8–23)
CALCIUM SERPL-MCNC: 9.9 MG/DL (ref 8.3–10.4)
CASTS URNS QL MICRO: 0 /LPF
CHLORIDE SERPL-SCNC: 107 MMOL/L (ref 103–113)
CHOLEST SERPL-MCNC: 198 MG/DL
CO2 SERPL-SCNC: 28 MMOL/L (ref 21–32)
COLOR UR: ABNORMAL
CREAT SERPL-MCNC: 0.9 MG/DL (ref 0.6–1)
CRYSTALS URNS QL MICRO: ABNORMAL /LPF
EPI CELLS #/AREA URNS HPF: ABNORMAL /HPF
ERYTHROCYTE [DISTWIDTH] IN BLOOD BY AUTOMATED COUNT: 12.3 % (ref 11.9–14.6)
GLOBULIN SER CALC-MCNC: 2.9 G/DL (ref 2.8–4.5)
GLUCOSE SERPL-MCNC: 96 MG/DL (ref 65–100)
GLUCOSE UR STRIP.AUTO-MCNC: NEGATIVE MG/DL
HCT VFR BLD AUTO: 40.9 % (ref 35.8–46.3)
HDLC SERPL-MCNC: 56 MG/DL (ref 40–60)
HDLC SERPL: 3.5
HGB BLD-MCNC: 13.2 G/DL (ref 11.7–15.4)
HGB UR QL STRIP: NEGATIVE
KETONES UR QL STRIP.AUTO: ABNORMAL MG/DL
LDLC SERPL CALC-MCNC: 122.8 MG/DL
LEUKOCYTE ESTERASE UR QL STRIP.AUTO: ABNORMAL
MCH RBC QN AUTO: 32.1 PG (ref 26.1–32.9)
MCHC RBC AUTO-ENTMCNC: 32.3 G/DL (ref 31.4–35)
MCV RBC AUTO: 99.5 FL (ref 82–102)
MUCOUS THREADS URNS QL MICRO: 0 /LPF
NITRITE UR QL STRIP.AUTO: NEGATIVE
NRBC # BLD: 0 K/UL (ref 0–0.2)
OTHER OBSERVATIONS: ABNORMAL
PH UR STRIP: 5 (ref 5–9)
PLATELET # BLD AUTO: 220 K/UL (ref 150–450)
PMV BLD AUTO: 10.6 FL (ref 9.4–12.3)
POTASSIUM SERPL-SCNC: 3.9 MMOL/L (ref 3.5–5.1)
PROT SERPL-MCNC: 7 G/DL (ref 6.3–8.2)
PROT UR STRIP-MCNC: ABNORMAL MG/DL
RBC # BLD AUTO: 4.11 M/UL (ref 4.05–5.2)
RBC #/AREA URNS HPF: ABNORMAL /HPF
SODIUM SERPL-SCNC: 137 MMOL/L (ref 136–146)
SP GR UR REFRACTOMETRY: 1.03 (ref 1–1.02)
TRIGL SERPL-MCNC: 96 MG/DL (ref 35–150)
URINE CULTURE IF INDICATED: ABNORMAL
UROBILINOGEN UR QL STRIP.AUTO: 1 EU/DL (ref 0.2–1)
VLDLC SERPL CALC-MCNC: 19.2 MG/DL (ref 6–23)
WBC # BLD AUTO: 4.8 K/UL (ref 4.3–11.1)
WBC URNS QL MICRO: ABNORMAL /HPF

## 2024-01-17 PROCEDURE — 96372 THER/PROPH/DIAG INJ SC/IM: CPT | Performed by: FAMILY MEDICINE

## 2024-01-17 PROCEDURE — 99214 OFFICE O/P EST MOD 30 MIN: CPT | Performed by: FAMILY MEDICINE

## 2024-01-17 RX ORDER — FLUTICASONE PROPIONATE 50 MCG
2 SPRAY, SUSPENSION (ML) NASAL DAILY
Qty: 16 G | Refills: 5 | Status: CANCELLED | OUTPATIENT
Start: 2024-01-17

## 2024-01-17 RX ORDER — CYANOCOBALAMIN 1000 UG/ML
1000 INJECTION, SOLUTION INTRAMUSCULAR; SUBCUTANEOUS ONCE
Status: COMPLETED | OUTPATIENT
Start: 2024-01-17 | End: 2024-01-17

## 2024-01-17 RX ORDER — FAMOTIDINE 20 MG/1
20 TABLET, FILM COATED ORAL 2 TIMES DAILY PRN
Qty: 180 TABLET | Refills: 1 | Status: SHIPPED | OUTPATIENT
Start: 2024-01-17

## 2024-01-17 RX ORDER — ALENDRONATE SODIUM 70 MG/1
70 TABLET ORAL
Qty: 12 TABLET | Refills: 1 | Status: SHIPPED | OUTPATIENT
Start: 2024-01-17

## 2024-01-17 RX ORDER — LORAZEPAM 0.5 MG/1
0.5 TABLET ORAL EVERY 6 HOURS PRN
Qty: 30 TABLET | Refills: 2 | Status: SHIPPED | OUTPATIENT
Start: 2024-01-17 | End: 2024-04-16

## 2024-01-17 RX ORDER — PHENAZOPYRIDINE HYDROCHLORIDE 100 MG/1
100 TABLET, FILM COATED ORAL 3 TIMES DAILY PRN
Qty: 30 TABLET | Refills: 2 | Status: SHIPPED | OUTPATIENT
Start: 2024-01-17 | End: 2025-01-16

## 2024-01-17 RX ORDER — ESTRADIOL 0.1 MG/G
1 CREAM VAGINAL
Qty: 42.5 G | Refills: 5 | Status: SHIPPED | OUTPATIENT
Start: 2024-01-18

## 2024-01-17 RX ADMIN — CYANOCOBALAMIN 1000 MCG: 1000 INJECTION, SOLUTION INTRAMUSCULAR; SUBCUTANEOUS at 08:57

## 2024-01-17 ASSESSMENT — PATIENT HEALTH QUESTIONNAIRE - PHQ9
1. LITTLE INTEREST OR PLEASURE IN DOING THINGS: 0
SUM OF ALL RESPONSES TO PHQ QUESTIONS 1-9: 0
SUM OF ALL RESPONSES TO PHQ QUESTIONS 1-9: 0
SUM OF ALL RESPONSES TO PHQ9 QUESTIONS 1 & 2: 0
2. FEELING DOWN, DEPRESSED OR HOPELESS: 0
SUM OF ALL RESPONSES TO PHQ QUESTIONS 1-9: 0
SUM OF ALL RESPONSES TO PHQ QUESTIONS 1-9: 0

## 2024-01-17 NOTE — PROGRESS NOTES
Bakari Hilliard DO  Diplomate of the [employer]  [facility]      Yue Vigil (: 1960) is a 63 y.o. female, here for evaluation of the following chief complaint(s):  Anxiety and Epistaxis       ASSESSMENT/PLAN:  1. Epistaxis  2. Dysuria  -     Urinalysis with Reflex to Culture; Future  -     phenazopyridine (PYRIDIUM) 100 MG tablet; Take 1 tablet by mouth 3 times daily as needed for Pain, Disp-30 tablet, R-2Normal  3. Bronchiectasis without complication (HCC)  4. Gastroesophageal reflux disease without esophagitis  -     famotidine (PEPCID) 20 MG tablet; Take 1 tablet by mouth 2 times daily as needed (Indigestion), Disp-180 tablet, R-1Normal  5. Age-related osteoporosis without current pathological fracture  -     alendronate (FOSAMAX) 70 MG tablet; Take 1 tablet by mouth every 7 days, Disp-12 tablet, R-1Normal  6. Panic disorder without agoraphobia  -     LORazepam (ATIVAN) 0.5 MG tablet; Take 1 tablet by mouth every 6 hours as needed for Anxiety for up to 90 days. Max Daily Amount: 2 mg, Disp-30 tablet, R-2Normal  7. B12 deficiency  -     cyanocobalamin injection 1,000 mcg; 1,000 mcg, IntraMUSCular, ONCE, 1 dose, On 24 at 0915  8. Mild intermittent asthma without complication  9. Vaginal atrophy  -     estradiol (ESTRACE) 0.1 MG/GM vaginal cream; Place 1 g vaginally Twice a Week, Disp-42.5 g, R-5Normal  10. Screening mammogram for breast cancer  -     [facility] DIGITAL SCREEN W OR WO CAD BILATERAL; Future  11. Mixed hyperlipidemia  -     Basic Metabolic Panel; Future  -     Hepatic Function Panel; Future  -     Lipid Panel; Future  -     CBC; Future     Nasal saline rinses/spray, hold flonase.  ENT if not improving.  Will check UA.  Encourage follow-up with pulmonology.  Will continue with inhalers as she is getting good clinical benefit.  Continue with topical estrogen cream.  Will continue with lorazepam for anxiety she is tolerating well without

## 2024-01-21 RX ORDER — NITROFURANTOIN 25; 75 MG/1; MG/1
100 CAPSULE ORAL 2 TIMES DAILY
Qty: 14 CAPSULE | Refills: 0 | Status: SHIPPED | OUTPATIENT
Start: 2024-01-21 | End: 2024-01-28

## 2024-01-30 ENCOUNTER — NURSE ONLY (OUTPATIENT)
Dept: INTERNAL MEDICINE CLINIC | Facility: CLINIC | Age: 64
End: 2024-01-30
Payer: COMMERCIAL

## 2024-01-30 DIAGNOSIS — Z87.440 HISTORY OF UTI: Primary | ICD-10-CM

## 2024-01-30 DIAGNOSIS — Z87.440 HISTORY OF UTI: ICD-10-CM

## 2024-01-30 LAB
BILIRUBIN, URINE, POC: NEGATIVE
BLOOD URINE, POC: NEGATIVE
GLUCOSE URINE, POC: NEGATIVE
KETONES, URINE, POC: POSITIVE
LEUKOCYTE ESTERASE, URINE, POC: NEGATIVE
NITRITE, URINE, POC: NEGATIVE
PH, URINE, POC: 7 (ref 4.6–8)
PROTEIN,URINE, POC: NEGATIVE
SPECIFIC GRAVITY, URINE, POC: 1.02 (ref 1–1.03)
URINALYSIS CLARITY, POC: CLEAR
URINALYSIS COLOR, POC: YELLOW
UROBILINOGEN, POC: NORMAL

## 2024-01-30 PROCEDURE — 81003 URINALYSIS AUTO W/O SCOPE: CPT | Performed by: FAMILY MEDICINE

## 2024-02-02 LAB
BACTERIA SPEC CULT: NORMAL
BACTERIA SPEC CULT: NORMAL
SERVICE CMNT-IMP: NORMAL

## 2024-02-08 ENCOUNTER — TELEPHONE (OUTPATIENT)
Dept: PULMONOLOGY | Age: 64
End: 2024-02-08

## 2024-02-08 NOTE — TELEPHONE ENCOUNTER
Roberth says that Dr Begum told  last week that she was shilpa be seen in office this week . I see no notes are messages. I tried to give patient next available with Dr. Begum she said not I'm suppose to see her this week . I have nothing available this week

## 2024-02-13 ENCOUNTER — TELEPHONE (OUTPATIENT)
Dept: PULMONOLOGY | Age: 64
End: 2024-02-13

## 2024-02-13 DIAGNOSIS — R91.1 PULMONARY NODULE: Primary | ICD-10-CM

## 2024-02-13 NOTE — TELEPHONE ENCOUNTER
----- Message from Robyn Begum MD sent at 2/7/2024  1:24 PM EST -----  Please can you set this lady up for [place] procedure with Chucho. Also she will need new CT scan prior to that.  I have talked to her and told her we will arrange that and also talked to chucho about her  Thanks  Robyn Begum MD

## 2024-02-15 ENCOUNTER — NURSE ONLY (OUTPATIENT)
Dept: INTERNAL MEDICINE CLINIC | Facility: CLINIC | Age: 64
End: 2024-02-15
Payer: COMMERCIAL

## 2024-02-15 ENCOUNTER — PREP FOR PROCEDURE (OUTPATIENT)
Dept: PULMONOLOGY | Age: 64
End: 2024-02-15

## 2024-02-15 DIAGNOSIS — E53.8 B12 DEFICIENCY: Primary | ICD-10-CM

## 2024-02-15 PROCEDURE — 96372 THER/PROPH/DIAG INJ SC/IM: CPT | Performed by: FAMILY MEDICINE

## 2024-02-15 RX ORDER — CYANOCOBALAMIN 1000 UG/ML
1000 INJECTION, SOLUTION INTRAMUSCULAR; SUBCUTANEOUS ONCE
Status: COMPLETED | OUTPATIENT
Start: 2024-02-15 | End: 2024-02-15

## 2024-02-15 RX ORDER — MULTIVIT-MIN/IRON/FOLIC ACID/K 18-600-40
1 CAPSULE ORAL
COMMUNITY

## 2024-02-15 RX ORDER — SODIUM CHLORIDE 0.9 % (FLUSH) 0.9 %
5-40 SYRINGE (ML) INJECTION PRN
Status: CANCELLED | OUTPATIENT
Start: 2024-02-15

## 2024-02-15 RX ORDER — SODIUM CHLORIDE 0.9 % (FLUSH) 0.9 %
5-40 SYRINGE (ML) INJECTION EVERY 12 HOURS SCHEDULED
Status: CANCELLED | OUTPATIENT
Start: 2024-02-15

## 2024-02-15 RX ORDER — SODIUM CHLORIDE 9 MG/ML
INJECTION, SOLUTION INTRAVENOUS PRN
Status: CANCELLED | OUTPATIENT
Start: 2024-02-15

## 2024-02-15 RX ADMIN — CYANOCOBALAMIN 1000 MCG: 1000 INJECTION, SOLUTION INTRAMUSCULAR; SUBCUTANEOUS at 09:02

## 2024-02-15 NOTE — TELEPHONE ENCOUNTER
I have spoken with patient.  She reports that she was not aware that she needed additional biopsy but is agreeable to allowing me to schedule this on 3/5/2024.  She wishes to keep appointment with Dr Bgeum next week for further discussion.  She confirms no blood thinners.  She is aware that she will need to hold all vitamins and supplements for 1 week prior.  She is aware to be NPO and have an adult with her for transportation.  We have discussed that she will need to check in on 2nd floor of [facility] for planning CT chest. Instructions are placed on AVS for 2/22 appointment with Dr Begum.

## 2024-02-19 ENCOUNTER — TELEPHONE (OUTPATIENT)
Dept: PULMONOLOGY | Age: 64
End: 2024-02-19

## 2024-02-19 NOTE — TELEPHONE ENCOUNTER
Scheduled for navigational bronch on 3/5.    Reviewed at length ALL procedure instructions. Over 10 minutes spent with education and review. Pt has appt with Dr Begum later this week.

## 2024-02-19 NOTE — TELEPHONE ENCOUNTER
Patient is calling concerning her biopsy.  Asking about what medicines to stop before this and the time to be there

## 2024-02-22 ENCOUNTER — OFFICE VISIT (OUTPATIENT)
Dept: PULMONOLOGY | Age: 64
End: 2024-02-22
Payer: COMMERCIAL

## 2024-02-22 VITALS
OXYGEN SATURATION: 100 % | WEIGHT: 121 LBS | SYSTOLIC BLOOD PRESSURE: 140 MMHG | DIASTOLIC BLOOD PRESSURE: 80 MMHG | HEIGHT: 62 IN | RESPIRATION RATE: 20 BRPM | HEART RATE: 71 BPM | BODY MASS INDEX: 22.26 KG/M2 | TEMPERATURE: 98 F

## 2024-02-22 DIAGNOSIS — R91.1 LUNG NODULE: ICD-10-CM

## 2024-02-22 DIAGNOSIS — R93.89 ABNORMAL CHEST CT: Primary | ICD-10-CM

## 2024-02-22 PROCEDURE — 99214 OFFICE O/P EST MOD 30 MIN: CPT | Performed by: INTERNAL MEDICINE

## 2024-02-22 NOTE — H&P (VIEW-ONLY)
Medical History:   Diagnosis Date    Anemia     Anxiety disorder     B12 deficiency     Bronchiectasis without complication (HCC) 10/3/2018    Depression     Gastroesophageal reflux     Hashimoto's disease 8/16/2017    pt denies (3/15/2023)    Hx of blood clots     resolved, could not tolerate meds to treat; resolved with \"compresses\". Happened \"years ago\"per pt    Irritable bowel syndrome     Irritable bowel syndrome with diarrhea     Menopause     50s    Mild intermittent asthma without complication 10/3/2018    Osteopenia          Patient Active Problem List   Diagnosis    Gastroesophageal reflux    Pulmonary nodule    Anxiety disorder    B12 deficiency    Mild intermittent asthma without complication    Mixed hyperlipidemia    Dyspareunia in female    Hashimoto's disease    Bronchiectasis without complication (HCC)    Irritable bowel syndrome with diarrhea    Vaginal atrophy    Age-related osteoporosis without current pathological fracture    Cavitating mass of lung           Past Surgical History:   Procedure Laterality Date    APPENDECTOMY      BRONCHOSCOPY N/A 3/28/2023    BRONCHOSCOPY  performed by Matthias Bocanegra MD at [facility] ENDOSCOPY    BRONCHOSCOPY N/A 3/28/2023    BRONCHOSCOPY W/EBUS FNA ROBOTIC/MONARCH performed by Matthias Bocanegra MD at [facility] ENDOSCOPY    CHOLECYSTECTOMY      CT NEEDLE BIOPSY LUNG PERCUTANEOUS  4/10/2023    CT NEEDLE BIOPSY LUNG PERCUTANEOUS 4/10/2023 [facility] RADIOLOGY CT SCAN    ORTHOPEDIC SURGERY Left 2007    hip    TOTAL HIP ARTHROPLASTY Left            Social History     Socioeconomic History    Marital status: Single     Spouse name: Not on file    Number of children: Not on file    Years of education: Not on file    Highest education level: Not on file   Occupational History    Not on file   Tobacco Use    Smoking status: Never     Passive exposure: Never    Smokeless tobacco: Never   Vaping Use    Vaping Use: Never used   Substance and Sexual Activity    Alcohol use: No    Drug use: No

## 2024-02-22 NOTE — PROGRESS NOTES
normal.     NEURO:   The patient is alert and oriented to person, place, and time.  Memory appears intact and mood is normal.  No gross sensorimotor deficits are present.      DIAGNOSTIC TESTS:   PCXR: No valid procedures specified.     CXR PA and lateral:  No results found for this or any previous visit.        Screening chest CT: No results found for this or any previous visit.       CT of chest without contrast:   No results found for this or any previous visit.       CT of chest with contrast:  No valid procedures specified.     PET/CT: No valid procedures specified.     Spirometry:         No data to display                      ASSESSMENT:  (Medical Decision Making)      Diagnosis Orders   1. Abnormal chest CT        2. Lung nodule  Had significant right upper lobe mass with multiple biopsies done.  Felt it felt to be abscess however biopsies confirm granulomatous necrosis and inflammation.  She was started on prednisone and follow-up CT scan showed improvement in the right upper lobe mass however she had only prednisone for 3 months till October and in December CT scan did not show improvement and showed a more solid component of the mass therefore we decided to repeat bronchoscopy with biopsies      3. Uncomplicated asthma, unspecified asthma severity, unspecified whether persistent  On spiriva, d           PLAN:-  -She is scheduled for CT scan in March 7 with follow-up bronchoscopy the same day    No orders of the defined types were placed in this encounter.              Over 50% of today's office visit was spent in face to face time reviewing test results/records, prognosis, importance of compliance, education about disease process, benefits of medications, instructions for management of acute flare-ups, and follow up plans.  Total time spent was 30  minutes.      MARY HANKS MA  Electronically signed    Dictated using voice recognition software.  Proof read but unrecognized errors may exist.

## 2024-02-23 PROBLEM — R93.89 ABNORMAL CHEST CT: Status: ACTIVE | Noted: 2024-02-23

## 2024-02-28 ENCOUNTER — TELEPHONE (OUTPATIENT)
Dept: PULMONOLOGY | Age: 64
End: 2024-02-28

## 2024-02-28 ENCOUNTER — HOSPITAL ENCOUNTER (OUTPATIENT)
Dept: MAMMOGRAPHY | Age: 64
Discharge: HOME | End: 2024-03-02
Attending: FAMILY MEDICINE
Payer: COMMERCIAL

## 2024-02-28 VITALS — HEIGHT: 62 IN | WEIGHT: 121 LBS | BODY MASS INDEX: 22.26 KG/M2

## 2024-02-28 DIAGNOSIS — Z12.31 SCREENING MAMMOGRAM FOR BREAST CANCER: ICD-10-CM

## 2024-02-28 PROCEDURE — 77063 BREAST TOMOSYNTHESIS BI: CPT

## 2024-02-28 NOTE — TELEPHONE ENCOUNTER
Patient says that she has heartburn . She is asking if she can take Pepcid for it . She wanted to ask since her Bronchoscopy is coming up on the 5th she has had to stop some of the other meds

## 2024-03-04 ENCOUNTER — ANESTHESIA EVENT (OUTPATIENT)
Dept: ENDOSCOPY | Age: 64
End: 2024-03-04
Payer: COMMERCIAL

## 2024-03-04 RX ORDER — IBUPROFEN 600 MG/1
1 TABLET ORAL PRN
Status: CANCELLED | OUTPATIENT
Start: 2024-03-04

## 2024-03-04 RX ORDER — SODIUM CHLORIDE 0.9 % (FLUSH) 0.9 %
5-40 SYRINGE (ML) INJECTION PRN
Status: CANCELLED | OUTPATIENT
Start: 2024-03-04

## 2024-03-04 RX ORDER — PROCHLORPERAZINE EDISYLATE 5 MG/ML
5 INJECTION INTRAMUSCULAR; INTRAVENOUS
Status: CANCELLED | OUTPATIENT
Start: 2024-03-04 | End: 2024-03-05

## 2024-03-04 RX ORDER — DIPHENHYDRAMINE HYDROCHLORIDE 50 MG/ML
12.5 INJECTION INTRAMUSCULAR; INTRAVENOUS
Status: CANCELLED | OUTPATIENT
Start: 2024-03-04 | End: 2024-03-05

## 2024-03-04 RX ORDER — HYDROMORPHONE HYDROCHLORIDE 2 MG/ML
0.5 INJECTION, SOLUTION INTRAMUSCULAR; INTRAVENOUS; SUBCUTANEOUS EVERY 10 MIN PRN
Status: CANCELLED | OUTPATIENT
Start: 2024-03-04

## 2024-03-04 RX ORDER — SODIUM CHLORIDE, SODIUM LACTATE, POTASSIUM CHLORIDE, CALCIUM CHLORIDE 600; 310; 30; 20 MG/100ML; MG/100ML; MG/100ML; MG/100ML
INJECTION, SOLUTION INTRAVENOUS CONTINUOUS
Status: CANCELLED | OUTPATIENT
Start: 2024-03-04

## 2024-03-04 RX ORDER — SODIUM CHLORIDE 0.9 % (FLUSH) 0.9 %
5-40 SYRINGE (ML) INJECTION EVERY 12 HOURS SCHEDULED
Status: CANCELLED | OUTPATIENT
Start: 2024-03-04

## 2024-03-04 RX ORDER — OXYCODONE HYDROCHLORIDE 5 MG/1
5 TABLET ORAL
Status: CANCELLED | OUTPATIENT
Start: 2024-03-04 | End: 2024-03-05

## 2024-03-04 RX ORDER — SODIUM CHLORIDE 9 MG/ML
INJECTION, SOLUTION INTRAVENOUS PRN
Status: CANCELLED | OUTPATIENT
Start: 2024-03-04

## 2024-03-04 RX ORDER — DEXTROSE MONOHYDRATE 100 MG/ML
INJECTION, SOLUTION INTRAVENOUS CONTINUOUS PRN
Status: CANCELLED | OUTPATIENT
Start: 2024-03-04

## 2024-03-04 NOTE — PRE-PROCEDURE INSTRUCTIONS
Patient verified name and .  Order for consent was found in EHR and matches case posting; patient verifies procedure.   Type lB surgery, phone assessment complete.  Orders were received.  Labs per surgeon: none at present time  Labs per anesthesia protocol: none    Patient answered medical/surgical history questions at their best of ability. All prior to admission medications documented in EPIC.    Patient instructed to continue taking all prescription medications up to the day of surgery but to take only the following medications the day of surgery according to anesthesia guidelines with a small sip of water: Pepcid, Allegra, Spirivia and Ativan if needed     Patient is requested to take 2 Tylenol in the morning and then again before bed on the day before surgery. Regular or extra strength may be used.       Patient informed that all vitamins and supplements should be held 7 days prior to surgery and NSAIDS 5 days prior to surgery.   Prescription meds to hold:none    Patient instructed on the following:    > Arrive at [facility] Entrance, time of arrival to be called the day before by 1700  > NPO after midnight, unless otherwise indicated, including gum, mints, and ice chips  > Responsible adult must drive patient to the hospital, stay during surgery, and patient will need supervision 24 hours after anesthesia  > Use non moisturizing soap in shower the night before surgery and on the morning of surgery  > All piercings must be removed prior to arrival.    > Leave all valuables (money and jewelry) at home but bring insurance card and ID on DOS.   > You may be required to pay a deductible or co-pay on the day of your procedure. You can pre-pay by calling [phone] if your surgery is at the [facility] or [phone] if your surgery is at the [facility].  > Do not wear make-up, nail polish, lotions, cologne, perfumes, powders, or oil on skin. Artificial nails are not permitted.

## 2024-03-05 ENCOUNTER — APPOINTMENT (OUTPATIENT)
Dept: CT IMAGING | Age: 64
End: 2024-03-05
Attending: INTERNAL MEDICINE
Payer: COMMERCIAL

## 2024-03-05 ENCOUNTER — HOSPITAL ENCOUNTER (OUTPATIENT)
Age: 64
Setting detail: OUTPATIENT SURGERY
Discharge: HOME | End: 2024-03-05
Attending: INTERNAL MEDICINE
Payer: COMMERCIAL

## 2024-03-05 ENCOUNTER — APPOINTMENT (OUTPATIENT)
Dept: GENERAL RADIOLOGY | Age: 64
End: 2024-03-05
Attending: INTERNAL MEDICINE
Payer: COMMERCIAL

## 2024-03-05 ENCOUNTER — CLINICAL DOCUMENTATION (OUTPATIENT)
Dept: PULMONOLOGY | Age: 64
End: 2024-03-05

## 2024-03-05 ENCOUNTER — ANESTHESIA (OUTPATIENT)
Dept: ENDOSCOPY | Age: 64
End: 2024-03-05
Payer: COMMERCIAL

## 2024-03-05 VITALS
TEMPERATURE: 98 F | BODY MASS INDEX: 22.31 KG/M2 | RESPIRATION RATE: 12 BRPM | SYSTOLIC BLOOD PRESSURE: 170 MMHG | DIASTOLIC BLOOD PRESSURE: 79 MMHG | HEIGHT: 62 IN | HEART RATE: 82 BPM | OXYGEN SATURATION: 100 % | WEIGHT: 121.2 LBS

## 2024-03-05 DIAGNOSIS — R91.1 PULMONARY NODULE: ICD-10-CM

## 2024-03-05 DIAGNOSIS — J98.4 CAVITATING MASS OF LUNG: Primary | ICD-10-CM

## 2024-03-05 PROCEDURE — 2580000003 HC RX 258: Performed by: ANESTHESIOLOGY

## 2024-03-05 PROCEDURE — 71250 CT THORAX DX C-: CPT

## 2024-03-05 RX ORDER — LIDOCAINE HYDROCHLORIDE 10 MG/ML
1 INJECTION, SOLUTION INFILTRATION; PERINEURAL
Status: DISCONTINUED | OUTPATIENT
Start: 2024-03-05 | End: 2024-03-06 | Stop reason: HOSPADM

## 2024-03-05 RX ORDER — SODIUM CHLORIDE 9 MG/ML
INJECTION, SOLUTION INTRAVENOUS PRN
Status: DISCONTINUED | OUTPATIENT
Start: 2024-03-05 | End: 2024-03-17 | Stop reason: HOSPADM

## 2024-03-05 RX ORDER — SODIUM CHLORIDE 0.9 % (FLUSH) 0.9 %
5-40 SYRINGE (ML) INJECTION EVERY 12 HOURS SCHEDULED
Status: DISCONTINUED | OUTPATIENT
Start: 2024-03-05 | End: 2024-03-17 | Stop reason: HOSPADM

## 2024-03-05 RX ORDER — SODIUM CHLORIDE 0.9 % (FLUSH) 0.9 %
5-40 SYRINGE (ML) INJECTION PRN
Status: DISCONTINUED | OUTPATIENT
Start: 2024-03-05 | End: 2024-03-17 | Stop reason: HOSPADM

## 2024-03-05 RX ORDER — SODIUM CHLORIDE, SODIUM LACTATE, POTASSIUM CHLORIDE, CALCIUM CHLORIDE 600; 310; 30; 20 MG/100ML; MG/100ML; MG/100ML; MG/100ML
INJECTION, SOLUTION INTRAVENOUS CONTINUOUS
Status: DISCONTINUED | OUTPATIENT
Start: 2024-03-05 | End: 2024-03-17 | Stop reason: HOSPADM

## 2024-03-05 RX ADMIN — SODIUM CHLORIDE, POTASSIUM CHLORIDE, SODIUM LACTATE AND CALCIUM CHLORIDE: 600; 310; 30; 20 INJECTION, SOLUTION INTRAVENOUS at 08:37

## 2024-03-05 ASSESSMENT — PAIN - FUNCTIONAL ASSESSMENT: PAIN_FUNCTIONAL_ASSESSMENT: 0-10

## 2024-03-05 NOTE — ANESTHESIA PRE PROCEDURE
Department of Anesthesiology  Preprocedure Note       Name:  Yue Vigil   Age:  63 y.o.  :  1960                                          MRN:  [id number]         Date:  3/5/2024      Surgeon: Surgeon(s):  Matthias Bocanegra MD    Procedure: Procedure(s):  BRONCHOSCOPY ENDOBRONCHIAL ULTRASOUND ROBOTIC in fluoro and copy to Dr Begum ** CT preprocedure per Dr. Bocanegra    Medications prior to admission:   Prior to Admission medications    Medication Sig Start Date End Date Taking? Authorizing Provider   Cholecalciferol (VITAMIN D) 50 MCG (2000) CAPS capsule Take 1 capsule by mouth    Provider, MD Cem   estradiol (ESTRACE) 0.1 MG/GM vaginal cream Place 1 g vaginally Twice a Week 24   Bakari Hilliard DO   famotidine (PEPCID) 20 MG tablet Take 1 tablet by mouth 2 times daily as needed (Indigestion) 24   Bakari Hilliard DO   alendronate (FOSAMAX) 70 MG tablet Take 1 tablet by mouth every 7 days 24   Bakari Hilliard DO   LORazepam (ATIVAN) 0.5 MG tablet Take 1 tablet by mouth every 6 hours as needed for Anxiety for up to 90 days. Max Daily Amount: 2 mg 24  Bakari Hilliard DO   phenazopyridine (PYRIDIUM) 100 MG tablet Take 1 tablet by mouth 3 times daily as needed for Pain 24  Bakari Hilliard DO   tiotropium (SPIRIVA RESPIMAT) 1.25 MCG/ACT AERS inhaler Inhale 2 puffs into the lungs daily 10/2/23   Edilia Strickland PA   CALCIUM PO Take 333 mg by mouth daily    Provider, MD Cem   fluticasone (FLONASE) 50 MCG/ACT nasal spray 2 sprays by Nasal route daily Two puffs in each nosril at bedtime Indications: inflammation of the nose due to an allergy 23   Bakari Hilliard DO   fexofenadine (ALLEGRA) 180 MG tablet Take by mouth daily    Automatic Reconciliation, Ar   ibuprofen (ADVIL;MOTRIN) 100 MG tablet Take 1 tablet by mouth every 6 hours as needed    Automatic Reconciliation, Ar       Current medications:    Current

## 2024-03-05 NOTE — INTERVAL H&P NOTE
Update History & Physical    The patient's History and Physical of February 22,  was reviewed with the patient and I examined the patient. There was no change. The surgical site was confirmed by the patient and me.     Reviewed the pt's repeat CT scan with her today. Significant interval improvement in all abnormal areas in the right lung. Clinically she is feeling well. Some intermittent chest discomfort in her R chest that is responsive to ibuprofen. No severe cough, hemoptysis, other concerning symptoms. Contacted pulmonary office to set up repeat CT in 3 months with office follow up at that time.       Electronically signed by Matthias Bocanegra MD on 3/5/2024 at 10:05 AM

## 2024-03-05 NOTE — PROGRESS NOTES
Per Dr Bocanegra, patient needs 3 month follow up CT chest and office appointment with Dr Begum.  I have spoken with patient.  She is scheduled for follow up appointment with Dr Begum on May 30 and will call radiology scheduling to schedule follow up CT chest no sooner than May 28.  She will call if any questions.

## 2024-03-14 ENCOUNTER — NURSE ONLY (OUTPATIENT)
Dept: INTERNAL MEDICINE CLINIC | Facility: CLINIC | Age: 64
End: 2024-03-14
Payer: COMMERCIAL

## 2024-03-14 DIAGNOSIS — E53.8 B12 DEFICIENCY: Primary | ICD-10-CM

## 2024-03-14 PROCEDURE — 96372 THER/PROPH/DIAG INJ SC/IM: CPT | Performed by: FAMILY MEDICINE

## 2024-03-14 RX ORDER — CYANOCOBALAMIN 1000 UG/ML
1000 INJECTION, SOLUTION INTRAMUSCULAR; SUBCUTANEOUS ONCE
Status: COMPLETED | OUTPATIENT
Start: 2024-03-14 | End: 2024-03-14

## 2024-03-14 RX ADMIN — CYANOCOBALAMIN 1000 MCG: 1000 INJECTION, SOLUTION INTRAMUSCULAR; SUBCUTANEOUS at 09:10

## 2024-04-11 ENCOUNTER — NURSE ONLY (OUTPATIENT)
Dept: INTERNAL MEDICINE CLINIC | Facility: CLINIC | Age: 64
End: 2024-04-11
Payer: COMMERCIAL

## 2024-04-11 DIAGNOSIS — E53.8 B12 DEFICIENCY: Primary | ICD-10-CM

## 2024-04-11 PROCEDURE — 96372 THER/PROPH/DIAG INJ SC/IM: CPT | Performed by: FAMILY MEDICINE

## 2024-04-11 RX ORDER — CYANOCOBALAMIN 1000 UG/ML
1000 INJECTION, SOLUTION INTRAMUSCULAR; SUBCUTANEOUS ONCE
Status: COMPLETED | OUTPATIENT
Start: 2024-04-11 | End: 2024-04-11

## 2024-04-11 RX ADMIN — CYANOCOBALAMIN 1000 MCG: 1000 INJECTION, SOLUTION INTRAMUSCULAR; SUBCUTANEOUS at 08:49

## 2024-04-17 DIAGNOSIS — F41.0 PANIC DISORDER WITHOUT AGORAPHOBIA: Primary | ICD-10-CM

## 2024-04-17 RX ORDER — LORAZEPAM 0.5 MG/1
0.5 TABLET ORAL EVERY 6 HOURS PRN
Qty: 30 TABLET | Refills: 2 | Status: SHIPPED | OUTPATIENT
Start: 2024-04-17 | End: 2024-07-16

## 2024-05-10 ENCOUNTER — NURSE ONLY (OUTPATIENT)
Dept: INTERNAL MEDICINE CLINIC | Facility: CLINIC | Age: 64
End: 2024-05-10
Payer: COMMERCIAL

## 2024-05-10 DIAGNOSIS — E53.8 B12 DEFICIENCY: Primary | ICD-10-CM

## 2024-05-10 PROCEDURE — 96372 THER/PROPH/DIAG INJ SC/IM: CPT | Performed by: FAMILY MEDICINE

## 2024-05-10 RX ORDER — CYANOCOBALAMIN 1000 UG/ML
1000 INJECTION, SOLUTION INTRAMUSCULAR; SUBCUTANEOUS ONCE
Status: COMPLETED | OUTPATIENT
Start: 2024-05-10 | End: 2024-05-10

## 2024-05-10 RX ADMIN — CYANOCOBALAMIN 1000 MCG: 1000 INJECTION, SOLUTION INTRAMUSCULAR; SUBCUTANEOUS at 08:52

## 2024-05-20 ENCOUNTER — OFFICE VISIT (OUTPATIENT)
Dept: INTERNAL MEDICINE CLINIC | Facility: CLINIC | Age: 64
End: 2024-05-20
Payer: COMMERCIAL

## 2024-05-20 VITALS
DIASTOLIC BLOOD PRESSURE: 70 MMHG | HEART RATE: 73 BPM | OXYGEN SATURATION: 100 % | SYSTOLIC BLOOD PRESSURE: 140 MMHG | BODY MASS INDEX: 21.79 KG/M2 | WEIGHT: 123 LBS | HEIGHT: 63 IN

## 2024-05-20 DIAGNOSIS — H00.012 HORDEOLUM EXTERNUM OF RIGHT LOWER EYELID: Primary | ICD-10-CM

## 2024-05-20 PROCEDURE — 99213 OFFICE O/P EST LOW 20 MIN: CPT | Performed by: NURSE PRACTITIONER

## 2024-05-20 RX ORDER — ERYTHROMYCIN 5 MG/G
OINTMENT OPHTHALMIC
Qty: 7 G | Refills: 0 | Status: SHIPPED | OUTPATIENT
Start: 2024-05-20 | End: 2024-05-30

## 2024-05-20 NOTE — PROGRESS NOTES
Yue Vigil (:  1960) is a 63 y.o. female,Established patient, here for evaluation of the following chief complaint(s):  Eye Problem (Right eye  sx x 3 days) and Skin Problem (neck)      Assessment & Plan   1. Hordeolum externum of right lower eyelid      No follow-ups on file.     Stye right lower lash line  Recommend warm compresses   Can use topical ointment   No rash on neck visible    Subjective   Patient is here for a red bump on right lower eyelid that started Saturday. It was bothering her Saturday. Yesterday it was bothering her but not as bad and today less so.     Eye Problem   The right eye is affected. This is a new problem. Episode onset: 2 days. The problem has been gradually improving. There was no injury mechanism. She Does not wear contacts.   Skin Problem        Review of Systems       Objective   Physical Exam  Vitals reviewed.   Constitutional:       Appearance: Normal appearance.   HENT:      Head: Normocephalic.   Eyes:      General: Lids are normal.         Right eye: Hordeolum present.      Extraocular Movements: Extraocular movements intact.      Pupils: Pupils are equal, round, and reactive to light.     Musculoskeletal:      Cervical back: Neck supple.   Neurological:      Mental Status: She is alert.                  An electronic signature was used to authenticate this note.    --Kimberly Bailey, VILLA - CNP

## 2024-05-28 ENCOUNTER — HOSPITAL ENCOUNTER (OUTPATIENT)
Dept: CT IMAGING | Age: 64
Discharge: HOME | End: 2024-05-31
Attending: INTERNAL MEDICINE
Payer: COMMERCIAL

## 2024-05-28 ENCOUNTER — OFFICE VISIT (OUTPATIENT)
Dept: PULMONOLOGY | Age: 64
End: 2024-05-28
Payer: COMMERCIAL

## 2024-05-28 VITALS
RESPIRATION RATE: 18 BRPM | DIASTOLIC BLOOD PRESSURE: 70 MMHG | SYSTOLIC BLOOD PRESSURE: 125 MMHG | WEIGHT: 118 LBS | HEIGHT: 62 IN | HEART RATE: 61 BPM | BODY MASS INDEX: 21.71 KG/M2 | OXYGEN SATURATION: 100 % | TEMPERATURE: 97.2 F

## 2024-05-28 DIAGNOSIS — R93.89 ABNORMAL CHEST CT: ICD-10-CM

## 2024-05-28 DIAGNOSIS — J98.4 CAVITATING MASS OF LUNG: ICD-10-CM

## 2024-05-28 DIAGNOSIS — J45.909 UNCOMPLICATED ASTHMA, UNSPECIFIED ASTHMA SEVERITY, UNSPECIFIED WHETHER PERSISTENT: ICD-10-CM

## 2024-05-28 DIAGNOSIS — R91.1 LUNG NODULE: Primary | ICD-10-CM

## 2024-05-28 PROCEDURE — 99214 OFFICE O/P EST MOD 30 MIN: CPT | Performed by: INTERNAL MEDICINE

## 2024-05-28 PROCEDURE — 71250 CT THORAX DX C-: CPT

## 2024-05-28 NOTE — PROGRESS NOTES
[address]  [place] [address]  [phone]      Patient Name:  Yue Vigil  YOB: 1960      Office Visit 6/3/2024    CHIEF COMPLAINT:    Chief Complaint   Patient presents with    Follow-up         HISTORY OF PRESENT ILLNESS:     Patient is 63 years old female who is here today for follow-up of lung mass.  She was last time seen in February of this year.      Patient was originally seen in 2/ 2023 for abnormal CT scan and hemoptysis.  She had cavitary lesion found on CT scan and it was felt that it could be abscess therefore she was started on antibiotic at that time.  Her follow-up up CT scan did not show improvement and actually got better therefore she was set up for bronchoscopy with biopsies which was done in March 2, 2023    Cultures were negative and biopsy showed Rare epithelioid histiocytes suggestive of possible granulomatous inflammation are identified focally.  He subsequently also had IR guided biopsy in April which showed granuloma with focal necrosis.  AFB and fungus was negative.  After that she was started on prednisone.  She started on prednisone on June 20.  She just finished prednisone in October 27.  CT scan was in July which showed small right upper lobe cavitary lesion with less solid component and she had some other small nodules.  Initially the mass got little bigger on July CT scan after being on prednisone for a few weeks.  She finished prednisone in October because she did not like it however her follow-up CT scan in December 2023 which showed that it was completely solid maybe not bigger in size however not smaller.  At her last visit in February we decided she will need follow-up CT scan and possibly bronchoscopy.  However her follow-up CT scan showed that the nodule may have become smaller therefore bronchoscopy was canceled.  In March it was 1.4 x 1.7 and it was 2.5 x 2.1 in December.    Today she reports she is doing well.  She denies

## 2024-06-11 ENCOUNTER — NURSE ONLY (OUTPATIENT)
Dept: INTERNAL MEDICINE CLINIC | Facility: CLINIC | Age: 64
End: 2024-06-11
Payer: COMMERCIAL

## 2024-06-11 DIAGNOSIS — E53.8 B12 DEFICIENCY: Primary | ICD-10-CM

## 2024-06-11 PROCEDURE — 96372 THER/PROPH/DIAG INJ SC/IM: CPT | Performed by: FAMILY MEDICINE

## 2024-06-11 RX ORDER — CYANOCOBALAMIN 1000 UG/ML
1000 INJECTION, SOLUTION INTRAMUSCULAR; SUBCUTANEOUS ONCE
Status: COMPLETED | OUTPATIENT
Start: 2024-06-11 | End: 2024-06-11

## 2024-06-11 RX ADMIN — CYANOCOBALAMIN 1000 MCG: 1000 INJECTION, SOLUTION INTRAMUSCULAR; SUBCUTANEOUS at 08:54

## 2024-07-09 ENCOUNTER — NURSE ONLY (OUTPATIENT)
Dept: INTERNAL MEDICINE CLINIC | Facility: CLINIC | Age: 64
End: 2024-07-09
Payer: COMMERCIAL

## 2024-07-09 DIAGNOSIS — E53.8 B12 DEFICIENCY: Primary | ICD-10-CM

## 2024-07-09 DIAGNOSIS — M81.0 AGE-RELATED OSTEOPOROSIS WITHOUT CURRENT PATHOLOGICAL FRACTURE: ICD-10-CM

## 2024-07-09 PROCEDURE — 96372 THER/PROPH/DIAG INJ SC/IM: CPT | Performed by: FAMILY MEDICINE

## 2024-07-09 RX ORDER — ESTRADIOL 0.1 MG/G
1 CREAM VAGINAL
Qty: 42.5 G | Refills: 5 | Status: CANCELLED | OUTPATIENT
Start: 2024-07-11

## 2024-07-09 RX ORDER — ALENDRONATE SODIUM 70 MG/1
70 TABLET ORAL
Qty: 12 TABLET | Refills: 1 | Status: CANCELLED | OUTPATIENT
Start: 2024-07-09

## 2024-07-09 RX ORDER — CYANOCOBALAMIN 1000 UG/ML
1000 INJECTION, SOLUTION INTRAMUSCULAR; SUBCUTANEOUS ONCE
Status: COMPLETED | OUTPATIENT
Start: 2024-07-09 | End: 2024-07-09

## 2024-07-09 RX ORDER — FAMOTIDINE 20 MG/1
20 TABLET, FILM COATED ORAL 2 TIMES DAILY PRN
Qty: 180 TABLET | Refills: 1 | Status: CANCELLED | OUTPATIENT
Start: 2024-07-09

## 2024-07-09 RX ADMIN — CYANOCOBALAMIN 1000 MCG: 1000 INJECTION, SOLUTION INTRAMUSCULAR; SUBCUTANEOUS at 08:58

## 2024-07-10 RX ORDER — ALENDRONATE SODIUM 70 MG/1
70 TABLET ORAL
Qty: 12 TABLET | Refills: 1 | Status: SHIPPED | OUTPATIENT
Start: 2024-07-10

## 2024-07-17 ENCOUNTER — OFFICE VISIT (OUTPATIENT)
Dept: INTERNAL MEDICINE CLINIC | Facility: CLINIC | Age: 64
End: 2024-07-17
Payer: COMMERCIAL

## 2024-07-17 VITALS
OXYGEN SATURATION: 98 % | DIASTOLIC BLOOD PRESSURE: 78 MMHG | WEIGHT: 120 LBS | HEIGHT: 62 IN | HEART RATE: 60 BPM | BODY MASS INDEX: 22.08 KG/M2 | SYSTOLIC BLOOD PRESSURE: 120 MMHG

## 2024-07-17 DIAGNOSIS — F41.0 PANIC DISORDER WITHOUT AGORAPHOBIA: Primary | ICD-10-CM

## 2024-07-17 DIAGNOSIS — J45.20 MILD INTERMITTENT ASTHMA WITHOUT COMPLICATION: ICD-10-CM

## 2024-07-17 DIAGNOSIS — J30.9 CHRONIC ALLERGIC RHINITIS: ICD-10-CM

## 2024-07-17 PROCEDURE — 99214 OFFICE O/P EST MOD 30 MIN: CPT | Performed by: FAMILY MEDICINE

## 2024-07-17 RX ORDER — LORAZEPAM 0.5 MG/1
0.5 TABLET ORAL EVERY 6 HOURS PRN
Qty: 30 TABLET | Refills: 5 | Status: SHIPPED | OUTPATIENT
Start: 2024-07-17 | End: 2024-10-15

## 2024-07-17 RX ORDER — FLUTICASONE PROPIONATE 50 MCG
2 SPRAY, SUSPENSION (ML) NASAL DAILY
Qty: 16 G | Refills: 5 | Status: SHIPPED | OUTPATIENT
Start: 2024-07-17

## 2024-07-17 SDOH — ECONOMIC STABILITY: FOOD INSECURITY: WITHIN THE PAST 12 MONTHS, THE FOOD YOU BOUGHT JUST DIDN'T LAST AND YOU DIDN'T HAVE MONEY TO GET MORE.: PATIENT DECLINED

## 2024-07-17 SDOH — ECONOMIC STABILITY: FOOD INSECURITY: WITHIN THE PAST 12 MONTHS, YOU WORRIED THAT YOUR FOOD WOULD RUN OUT BEFORE YOU GOT MONEY TO BUY MORE.: PATIENT DECLINED

## 2024-07-17 SDOH — ECONOMIC STABILITY: INCOME INSECURITY: HOW HARD IS IT FOR YOU TO PAY FOR THE VERY BASICS LIKE FOOD, HOUSING, MEDICAL CARE, AND HEATING?: PATIENT DECLINED

## 2024-07-17 SDOH — ECONOMIC STABILITY: HOUSING INSECURITY
IN THE LAST 12 MONTHS, WAS THERE A TIME WHEN YOU DID NOT HAVE A STEADY PLACE TO SLEEP OR SLEPT IN A SHELTER (INCLUDING NOW)?: PATIENT DECLINED

## 2024-07-17 NOTE — PROGRESS NOTES
Topics    Alcohol use: No    Drug use: No     Vitals:    07/17/24 0855   BP: 120/78   Site: Left Upper Arm   Position: Sitting   Pulse: 60   SpO2: 98%   Weight: 54.4 kg (120 lb)   Height: 1.575 m (5' 2\")      Body mass index is 21.95 kg/m².  Physical Exam  Vitals reviewed.   Cardiovascular:      Rate and Rhythm: Normal rate and regular rhythm.   Pulmonary:      Effort: Pulmonary effort is normal.      Breath sounds: Normal breath sounds.   Skin:     General: Skin is warm and dry.   Neurological:      Mental Status: She is alert.       An electronic signature was used to authenticate this note.  Bakari Hilliard, DO   Elements of this note have been dictated via voice recognition software.  Text and phrases may be limited by the accuracy and autoconversion of the software.  The chart has been reviewed, but errors may still be present.

## 2024-08-06 ENCOUNTER — NURSE ONLY (OUTPATIENT)
Dept: INTERNAL MEDICINE CLINIC | Facility: CLINIC | Age: 64
End: 2024-08-06
Payer: MEDICARE

## 2024-08-06 DIAGNOSIS — E53.8 B12 DEFICIENCY: Primary | ICD-10-CM

## 2024-08-06 PROCEDURE — 96372 THER/PROPH/DIAG INJ SC/IM: CPT | Performed by: FAMILY MEDICINE

## 2024-08-06 RX ORDER — CYANOCOBALAMIN 1000 UG/ML
1000 INJECTION, SOLUTION INTRAMUSCULAR; SUBCUTANEOUS ONCE
Status: COMPLETED | OUTPATIENT
Start: 2024-08-06 | End: 2024-08-06

## 2024-08-06 RX ADMIN — CYANOCOBALAMIN 1000 MCG: 1000 INJECTION, SOLUTION INTRAMUSCULAR; SUBCUTANEOUS at 08:59

## 2024-08-07 ENCOUNTER — OFFICE VISIT (OUTPATIENT)
Dept: INTERNAL MEDICINE CLINIC | Facility: CLINIC | Age: 64
End: 2024-08-07
Payer: MEDICARE

## 2024-08-07 VITALS
DIASTOLIC BLOOD PRESSURE: 70 MMHG | SYSTOLIC BLOOD PRESSURE: 120 MMHG | BODY MASS INDEX: 22.26 KG/M2 | OXYGEN SATURATION: 98 % | HEART RATE: 64 BPM | HEIGHT: 62 IN | WEIGHT: 121 LBS

## 2024-08-07 DIAGNOSIS — K21.9 GASTROESOPHAGEAL REFLUX DISEASE, UNSPECIFIED WHETHER ESOPHAGITIS PRESENT: ICD-10-CM

## 2024-08-07 DIAGNOSIS — R30.0 DYSURIA: Primary | ICD-10-CM

## 2024-08-07 DIAGNOSIS — R35.0 URINARY FREQUENCY: ICD-10-CM

## 2024-08-07 LAB
BILIRUBIN, URINE, POC: NEGATIVE
BLOOD URINE, POC: NEGATIVE
GLUCOSE URINE, POC: NEGATIVE
KETONES, URINE, POC: NEGATIVE
LEUKOCYTE ESTERASE, URINE, POC: NEGATIVE
NITRITE, URINE, POC: NEGATIVE
PH, URINE, POC: 6 (ref 4.6–8)
PROTEIN,URINE, POC: NEGATIVE
SPECIFIC GRAVITY, URINE, POC: 1.02 (ref 1–1.03)
URINALYSIS CLARITY, POC: CLEAR
URINALYSIS COLOR, POC: YELLOW
UROBILINOGEN, POC: NORMAL

## 2024-08-07 PROCEDURE — 99213 OFFICE O/P EST LOW 20 MIN: CPT | Performed by: FAMILY MEDICINE

## 2024-08-07 PROCEDURE — 3017F COLORECTAL CA SCREEN DOC REV: CPT | Performed by: FAMILY MEDICINE

## 2024-08-07 PROCEDURE — G8427 DOCREV CUR MEDS BY ELIG CLIN: HCPCS | Performed by: FAMILY MEDICINE

## 2024-08-07 PROCEDURE — G8420 CALC BMI NORM PARAMETERS: HCPCS | Performed by: FAMILY MEDICINE

## 2024-08-07 PROCEDURE — 81003 URINALYSIS AUTO W/O SCOPE: CPT | Performed by: FAMILY MEDICINE

## 2024-08-07 PROCEDURE — 1036F TOBACCO NON-USER: CPT | Performed by: FAMILY MEDICINE

## 2024-08-07 NOTE — PROGRESS NOTES
Bakari Hilliard DO  Diplomate of the American Board of Family Medicine  [facility]      Yue Vigil (: 1960) is a 63 y.o. female, here for evaluation of the following chief complaint(s):  Urinary Pain and Urinary Frequency       ASSESSMENT/PLAN:  1. Dysuria  -     AMB POC URINALYSIS DIP STICK AUTO W/O MICRO  -     Culture, Urine; Future  2. Urinary frequency  3. Gastroesophageal reflux disease, unspecified whether esophagitis present       Results for orders placed or performed in visit on 24   AMB POC URINALYSIS DIP STICK AUTO W/O MICRO   Result Value Ref Range    Color, Urine, POC Yellow     Clarity, Urine, POC Clear     Glucose, Urine, POC Negative     Bilirubin, Urine, POC Negative     Ketones, Urine, POC Negative     Specific Gravity, Urine, POC 1.020 1.001 - 1.035    Blood, Urine, POC Negative     pH, Urine, POC 6.0 4.6 - 8.0    Protein, Urine, POC Negative     Urobilinogen, POC Normal     Nitrite, Urine, POC Negative     Leukocyte Esterase, Urine, POC Negative      Urine looks okay today here in the office.  Will go ahead and check culture.  Discussed increasing fluid intake as well.  Antireflux precautions and advised to go ahead and start taking Pepcid on a daily basis.  May increase to twice daily if needed.        SUBJECTIVE/OBJECTIVE:  HPI:  Patient comes in today with a chief complaint of having dysuria and urinary frequency over the past 24 hours.  Denies any hematuria.  No fevers.  No nausea or vomiting.  Has been experiencing some reflux symptoms at times.  She has tried Pepcid intermittently and it has given her good results.  Does not want to take PPI.  No dysphagia.  No melena or hematochezia.  ROS negative except as noted above today.    Social History     Tobacco Use    Smoking status: Never     Passive exposure: Never    Smokeless tobacco: Never   Vaping Use    Vaping Use: Never used   Substance Use Topics    Alcohol use: No    Drug use: No     Vitals:

## 2024-08-09 LAB
BACTERIA SPEC CULT: NORMAL
BACTERIA SPEC CULT: NORMAL
SERVICE CMNT-IMP: NORMAL

## 2024-08-12 ENCOUNTER — TELEPHONE (OUTPATIENT)
Dept: INTERNAL MEDICINE CLINIC | Facility: CLINIC | Age: 64
End: 2024-08-12

## 2024-08-12 NOTE — TELEPHONE ENCOUNTER
Per Dr. Hilliard- urine culture was normal. If still having problems will need follow up.     Attempted to contact patient, someone picked up/then hung up

## 2024-08-12 NOTE — TELEPHONE ENCOUNTER
Patient states she would like a call back about her lab culture results    Please call patient and advise

## 2024-08-23 RX ORDER — TIOTROPIUM BROMIDE INHALATION SPRAY 1.56 UG/1
2 SPRAY, METERED RESPIRATORY (INHALATION) DAILY
Qty: 1 EACH | Refills: 11 | Status: SHIPPED | OUTPATIENT
Start: 2024-08-23

## 2024-08-23 NOTE — TELEPHONE ENCOUNTER
Patient called requesting a refill on her tiotropium (SPIRIVA RESPIMAT) 1.25 MCG/ACT AERS inhaler. Please Advise.       Mark in TR

## 2024-09-03 ENCOUNTER — NURSE ONLY (OUTPATIENT)
Dept: INTERNAL MEDICINE CLINIC | Facility: CLINIC | Age: 64
End: 2024-09-03
Payer: MEDICARE

## 2024-09-03 DIAGNOSIS — E53.8 B12 DEFICIENCY: Primary | ICD-10-CM

## 2024-09-03 PROCEDURE — 96372 THER/PROPH/DIAG INJ SC/IM: CPT | Performed by: FAMILY MEDICINE

## 2024-09-03 RX ORDER — CYANOCOBALAMIN 1000 UG/ML
1000 INJECTION, SOLUTION INTRAMUSCULAR; SUBCUTANEOUS ONCE
Status: COMPLETED | OUTPATIENT
Start: 2024-09-03 | End: 2024-09-03

## 2024-09-03 RX ADMIN — CYANOCOBALAMIN 1000 MCG: 1000 INJECTION, SOLUTION INTRAMUSCULAR; SUBCUTANEOUS at 08:54

## 2024-09-05 ENCOUNTER — OFFICE VISIT (OUTPATIENT)
Dept: OBGYN CLINIC | Age: 64
End: 2024-09-05

## 2024-09-05 VITALS
HEIGHT: 62 IN | BODY MASS INDEX: 22.63 KG/M2 | WEIGHT: 123 LBS | DIASTOLIC BLOOD PRESSURE: 70 MMHG | SYSTOLIC BLOOD PRESSURE: 122 MMHG

## 2024-09-05 DIAGNOSIS — N95.2 VAGINAL ATROPHY: Primary | ICD-10-CM

## 2024-09-05 DIAGNOSIS — Z12.4 PAP SMEAR FOR CERVICAL CANCER SCREENING: ICD-10-CM

## 2024-09-05 DIAGNOSIS — Z01.419 WOMEN'S ANNUAL ROUTINE GYNECOLOGICAL EXAMINATION: ICD-10-CM

## 2024-09-05 RX ORDER — ESTRADIOL 0.1 MG/G
1 CREAM VAGINAL
Qty: 42.5 G | Refills: 5 | Status: SHIPPED | OUTPATIENT
Start: 2024-09-05

## 2024-09-05 NOTE — PROGRESS NOTES
Chaperone for Intimate Exam     Chaperone was offer accepted as part of the rooming process    Chaperone: Belle AYALA CMA     
I have reviewed the patient's visit today including history, exam and assessment by LEXX Rogel.  I agree with treatment/plan as above.    Dany Trejo MD  12:34 PM  09/05/24   
Pt comes in today for AE.      LAST PAP:  07/30/2021 negative, HPV negative      LAST MAMMO:  02/28/2024       LMP:  No LMP recorded. Patient is postmenopausal.    BIRTH CONTROL:  post menopausal status    TOBACCO USE:  No    FAMILY HISTORY OF:   Breast Cancer:  Yes   Ovarian Cancer:  No   Uterine Cancer:  No   Colon Cancer:  No    Vitals:    09/05/24 0941   BP: 122/70   Site: Left Upper Arm   Position: Sitting   Weight: 55.8 kg (123 lb)   Height: 1.575 m (5' 2\")        Belle Dey MA  09/05/24  9:53 AM    
and awake. Oriented to person/place/time. Able to follow commands    Eyes: EOM nl, Sclera nl, Ocular Discharge not visualized    HENT: Normocephalic and atraumatic. Mouth/Throat: Mucous membranes are moist. External Ears: nl. No cervical code adneopathy    Neck: Normal range of motion. Neck supple. No thyromegaly present.     Cardiovascular: Normal rate and regular rhythm.      Pulmonary: Effort normal; No visualized signs of difficulty breathing or respiratory distress; breath sounds normal.    Breast: The breasts exhibit no masses, no skin changes and no tenderness. No axillary node adenopathy    Abdominal: Soft. There is no tenderness. There is no rebound.     Pelvic Exam:       External: normal female genitalia without lesions or masses, atrophic labia, right side of mons Seborrheic keratosis     Urethra: normal      Vagina: normal without lesions or masses, scant clear physiologic discharge noted, atrophic      Cervix: normal without lesions or masses       Adnexa: normal bimanual exam without masses or fullness       Uterus: uterus is normal size, shape, consistency and nontender    Rectum: normal    Musculoskeletal: Normal range of motion. Normal gait with no signs of ataxia     Neurological: No Facial Asymmetry (Cranial nerve 7 motor function); No gaze palsy; normal coordination, muscle strength and tone     Skin: Skin is warm and dry. No significant exanthematous lesions or discoloration noted on facial skin      Psych: Normal affect. 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

## 2024-09-05 NOTE — ASSESSMENT & PLAN NOTE
Pap today, we discuss ok to D/C is negative  Mammo up to date 2/28/24  Colonoscopy 10/24//2017  DEXA 7/24/23 - osteoporosis, on fosamax  Rto 1 year

## 2024-09-05 NOTE — ASSESSMENT & PLAN NOTE
6/7/23: Exam today unremarkable, scant discharge noted     Atrophic vaginitis, pt not using estrogen as prescribed. We discuss restarting estrogen at nightly for 2 weeks and then 2x/week as prescribed     Scant VB noted by pt x1 occurrence. Will check endometrial strip on US at next visit  But suspect r/t vaginal atrophy     6/28/23: US today nl, endometrial stripe 2 mm  Suspect spotting r/t atrophic vaginitis.  Pt was prescribed estrogen cream by PCP but not using until last visit because difficult to insert     She is applying small amount to external genitalia which is helping a lot  We discus hx of blood clot, do NOT recommend systemic estrogen. Pea size amount applied to external genitalia likely safe as no studies have found increase risk of VTE with vaginal preparation even in setting of hx VTE.  but pt does understand minimal risk of VTE and would like to proceed with continuing      We also discuss adding vaginal moisturizers as she did in 2021, samples given today for hyalogyn.   F/u prn and 1 year for annual    9/5/24: refill sent for estrace cream, currently using externally 2x/week, advised she can also use inside vagina 2x/week

## 2024-09-15 LAB
COLLECTION METHOD: NORMAL
CYTOLOGIST CVX/VAG CYTO: NORMAL
CYTOLOGY CVX/VAG DOC THIN PREP: NORMAL
HPV APTIMA: NEGATIVE
HPV GENOTYPE REFLEX: NORMAL
Lab: NORMAL
Lab: NORMAL
PAP SOURCE: NORMAL
PATH REPORT.FINAL DX SPEC: NORMAL
STAT OF ADQ CVX/VAG CYTO-IMP: NORMAL

## 2024-09-16 ENCOUNTER — TELEPHONE (OUTPATIENT)
Dept: OBGYN CLINIC | Age: 64
End: 2024-09-16

## 2024-10-03 ENCOUNTER — NURSE ONLY (OUTPATIENT)
Dept: INTERNAL MEDICINE CLINIC | Facility: CLINIC | Age: 64
End: 2024-10-03
Payer: MEDICARE

## 2024-10-03 DIAGNOSIS — E53.8 B12 DEFICIENCY: Primary | ICD-10-CM

## 2024-10-03 PROCEDURE — 96372 THER/PROPH/DIAG INJ SC/IM: CPT | Performed by: FAMILY MEDICINE

## 2024-10-03 RX ORDER — CYANOCOBALAMIN 1000 UG/ML
1000 INJECTION, SOLUTION INTRAMUSCULAR; SUBCUTANEOUS ONCE
Status: COMPLETED | OUTPATIENT
Start: 2024-10-03 | End: 2024-10-03

## 2024-10-03 RX ADMIN — CYANOCOBALAMIN 1000 MCG: 1000 INJECTION, SOLUTION INTRAMUSCULAR; SUBCUTANEOUS at 09:12

## 2024-10-05 PROBLEM — Z01.419 WOMEN'S ANNUAL ROUTINE GYNECOLOGICAL EXAMINATION: Status: RESOLVED | Noted: 2024-09-05 | Resolved: 2024-10-05

## 2024-11-05 ENCOUNTER — NURSE ONLY (OUTPATIENT)
Dept: INTERNAL MEDICINE CLINIC | Facility: CLINIC | Age: 64
End: 2024-11-05
Payer: MEDICARE

## 2024-11-05 DIAGNOSIS — E53.8 B12 DEFICIENCY: Primary | ICD-10-CM

## 2024-11-05 PROCEDURE — 96372 THER/PROPH/DIAG INJ SC/IM: CPT | Performed by: FAMILY MEDICINE

## 2024-11-05 RX ORDER — CYANOCOBALAMIN 1000 UG/ML
1000 INJECTION, SOLUTION INTRAMUSCULAR; SUBCUTANEOUS ONCE
Status: COMPLETED | OUTPATIENT
Start: 2024-11-05 | End: 2024-11-05

## 2024-11-05 RX ADMIN — CYANOCOBALAMIN 1000 MCG: 1000 INJECTION, SOLUTION INTRAMUSCULAR; SUBCUTANEOUS at 09:21

## 2024-11-06 ENCOUNTER — OFFICE VISIT (OUTPATIENT)
Dept: PULMONOLOGY | Age: 64
End: 2024-11-06
Payer: MEDICARE

## 2024-11-06 VITALS
TEMPERATURE: 97.2 F | SYSTOLIC BLOOD PRESSURE: 124 MMHG | DIASTOLIC BLOOD PRESSURE: 70 MMHG | OXYGEN SATURATION: 98 % | HEART RATE: 65 BPM | BODY MASS INDEX: 22.08 KG/M2 | HEIGHT: 62 IN | WEIGHT: 120 LBS | RESPIRATION RATE: 16 BRPM

## 2024-11-06 DIAGNOSIS — J45.909 UNCOMPLICATED ASTHMA, UNSPECIFIED ASTHMA SEVERITY, UNSPECIFIED WHETHER PERSISTENT: ICD-10-CM

## 2024-11-06 DIAGNOSIS — R91.1 LUNG NODULE: Primary | ICD-10-CM

## 2024-11-06 DIAGNOSIS — R93.89 ABNORMAL CHEST CT: ICD-10-CM

## 2024-11-06 PROCEDURE — 99214 OFFICE O/P EST MOD 30 MIN: CPT | Performed by: INTERNAL MEDICINE

## 2024-11-06 PROCEDURE — G8399 PT W/DXA RESULTS DOCUMENT: HCPCS | Performed by: INTERNAL MEDICINE

## 2024-11-06 PROCEDURE — G8420 CALC BMI NORM PARAMETERS: HCPCS | Performed by: INTERNAL MEDICINE

## 2024-11-06 PROCEDURE — G8427 DOCREV CUR MEDS BY ELIG CLIN: HCPCS | Performed by: INTERNAL MEDICINE

## 2024-11-06 PROCEDURE — G8484 FLU IMMUNIZE NO ADMIN: HCPCS | Performed by: INTERNAL MEDICINE

## 2024-11-06 PROCEDURE — 1123F ACP DISCUSS/DSCN MKR DOCD: CPT | Performed by: INTERNAL MEDICINE

## 2024-11-06 PROCEDURE — 3017F COLORECTAL CA SCREEN DOC REV: CPT | Performed by: INTERNAL MEDICINE

## 2024-11-06 PROCEDURE — 1090F PRES/ABSN URINE INCON ASSESS: CPT | Performed by: INTERNAL MEDICINE

## 2024-11-06 PROCEDURE — 1036F TOBACCO NON-USER: CPT | Performed by: INTERNAL MEDICINE

## 2024-11-06 NOTE — PROGRESS NOTES
Name:  Yue Vigil  YOB: 1960   MRN: [id number]      Office Visit: 11/7/2024       Assessment & Plan (Medical Decision Making)    Impression: 64 y.o. female here for follow-up of asthma and abnormal CT scan    1. Lung nodule      Patient was originally seen in 2/ 2023 for abnormal CT scan and hemoptysis.  Initially was treated with antibiotics for possible abscess however follow-up CT scan did not show any improvement therefore she had bronchoscopies with multiple biopsies done in March 2023.      Cultures were negative and biopsy showed Rare epithelioid histiocytes suggestive of possible granulomatous inflammation are identified focally.  He subsequently also had IR guided biopsy in April which showed granuloma with focal necrosis.  AFB and fungus was negative.  After that she was started on prednisone.  She started on prednisone on June 2023, and was on prednisone till end of October 2023.  Patient did not like taking prednisone therefore it was stopped and follow-up CT scan is in December 2023 showed that it was more solid however not changed in size therefore she was set up for another bronchoscopy however when she presented for for bronchoscopy it was canceled because it appears that nodule was smaller.   In March 2024 it was 1.4 x 1.7 and it was 2.5 x 2.1 in December 2023.  Follow-up CT scan in June 2024 showed stable 1.7 right upper lobe nodule with some interstitial thickening and bronchiectasis.    -We will do follow-up CT scan again in 6 months with complete pulmonary function test as she has not had those done in a long time    2. Abnormal chest CT      3. Uncomplicated asthma, unspecified asthma severity, unspecified whether persistent  -She is on Spiriva as she did not like Breo and she prefers Spiriva.  She does not use her rescue inhaler at all.    No orders of the defined types were placed in this encounter.    No orders of the defined types were placed in this

## 2024-11-15 ENCOUNTER — TELEPHONE (OUTPATIENT)
Dept: PULMONOLOGY | Age: 64
End: 2024-11-15

## 2024-11-15 DIAGNOSIS — R93.89 ABNORMAL CHEST CT: Primary | ICD-10-CM

## 2024-11-15 NOTE — TELEPHONE ENCOUNTER
Patient is needing a order for a ct scan has follow appt . Patient ask for a call so she can go ahead and get it scheduled

## 2024-12-03 ENCOUNTER — NURSE ONLY (OUTPATIENT)
Dept: INTERNAL MEDICINE CLINIC | Facility: CLINIC | Age: 64
End: 2024-12-03
Payer: MEDICARE

## 2024-12-03 DIAGNOSIS — E53.8 B12 DEFICIENCY: Primary | ICD-10-CM

## 2024-12-03 PROCEDURE — 96372 THER/PROPH/DIAG INJ SC/IM: CPT | Performed by: FAMILY MEDICINE

## 2024-12-03 RX ORDER — CYANOCOBALAMIN 1000 UG/ML
1000 INJECTION, SOLUTION INTRAMUSCULAR; SUBCUTANEOUS ONCE
Status: COMPLETED | OUTPATIENT
Start: 2024-12-03 | End: 2024-12-03

## 2024-12-03 RX ADMIN — CYANOCOBALAMIN 1000 MCG: 1000 INJECTION, SOLUTION INTRAMUSCULAR; SUBCUTANEOUS at 09:13

## 2024-12-18 DIAGNOSIS — M81.0 AGE-RELATED OSTEOPOROSIS WITHOUT CURRENT PATHOLOGICAL FRACTURE: ICD-10-CM

## 2024-12-18 RX ORDER — ALENDRONATE SODIUM 70 MG/1
70 TABLET ORAL
Qty: 12 TABLET | Refills: 1 | Status: SHIPPED | OUTPATIENT
Start: 2024-12-18

## 2024-12-18 NOTE — TELEPHONE ENCOUNTER
Needs refill on    alendronate (FOSAMAX) 70 MG tablet      Send to   [id number] - [place], [place] - [address] - P [phone] - F [phone]

## 2024-12-31 ENCOUNTER — NURSE ONLY (OUTPATIENT)
Dept: INTERNAL MEDICINE CLINIC | Facility: CLINIC | Age: 64
End: 2024-12-31
Payer: MEDICARE

## 2024-12-31 DIAGNOSIS — E53.8 B12 DEFICIENCY: Primary | ICD-10-CM

## 2024-12-31 PROCEDURE — 96372 THER/PROPH/DIAG INJ SC/IM: CPT | Performed by: FAMILY MEDICINE

## 2024-12-31 RX ORDER — CYANOCOBALAMIN 1000 UG/ML
1000 INJECTION, SOLUTION INTRAMUSCULAR; SUBCUTANEOUS ONCE
Status: COMPLETED | OUTPATIENT
Start: 2024-12-31 | End: 2024-12-31

## 2024-12-31 RX ADMIN — CYANOCOBALAMIN 1000 MCG: 1000 INJECTION, SOLUTION INTRAMUSCULAR; SUBCUTANEOUS at 09:10

## 2025-01-17 ENCOUNTER — OFFICE VISIT (OUTPATIENT)
Dept: INTERNAL MEDICINE CLINIC | Facility: CLINIC | Age: 65
End: 2025-01-17

## 2025-01-17 VITALS
OXYGEN SATURATION: 96 % | DIASTOLIC BLOOD PRESSURE: 70 MMHG | HEART RATE: 62 BPM | BODY MASS INDEX: 22.08 KG/M2 | SYSTOLIC BLOOD PRESSURE: 128 MMHG | WEIGHT: 120 LBS | HEIGHT: 62 IN

## 2025-01-17 DIAGNOSIS — F41.0 PANIC DISORDER WITHOUT AGORAPHOBIA: ICD-10-CM

## 2025-01-17 DIAGNOSIS — Z00.00 WELCOME TO MEDICARE PREVENTIVE VISIT: Primary | ICD-10-CM

## 2025-01-17 DIAGNOSIS — J47.9 BRONCHIECTASIS WITHOUT COMPLICATION (HCC): ICD-10-CM

## 2025-01-17 DIAGNOSIS — E78.2 MIXED HYPERLIPIDEMIA: ICD-10-CM

## 2025-01-17 DIAGNOSIS — K21.9 GASTROESOPHAGEAL REFLUX DISEASE WITHOUT ESOPHAGITIS: ICD-10-CM

## 2025-01-17 DIAGNOSIS — M25.551 RIGHT HIP PAIN: ICD-10-CM

## 2025-01-17 DIAGNOSIS — L65.9 ALOPECIA: ICD-10-CM

## 2025-01-17 DIAGNOSIS — M81.0 AGE-RELATED OSTEOPOROSIS WITHOUT CURRENT PATHOLOGICAL FRACTURE: ICD-10-CM

## 2025-01-17 LAB
ALBUMIN SERPL-MCNC: 4.3 G/DL (ref 3.2–4.6)
ALBUMIN/GLOB SERPL: 1.7 (ref 1–1.9)
ALP SERPL-CCNC: 57 U/L (ref 35–104)
ALT SERPL-CCNC: 12 U/L (ref 8–45)
ANION GAP SERPL CALC-SCNC: 11 MMOL/L (ref 7–16)
AST SERPL-CCNC: 23 U/L (ref 15–37)
BILIRUB DIRECT SERPL-MCNC: 0.4 MG/DL (ref 0–0.4)
BILIRUB SERPL-MCNC: 2.1 MG/DL (ref 0–1.2)
BUN SERPL-MCNC: 14 MG/DL (ref 8–23)
CALCIUM SERPL-MCNC: 9.5 MG/DL (ref 8.8–10.2)
CHLORIDE SERPL-SCNC: 106 MMOL/L (ref 98–107)
CHOLEST SERPL-MCNC: 198 MG/DL (ref 0–200)
CO2 SERPL-SCNC: 24 MMOL/L (ref 20–29)
CREAT SERPL-MCNC: 0.84 MG/DL (ref 0.6–1.1)
ERYTHROCYTE [DISTWIDTH] IN BLOOD BY AUTOMATED COUNT: 12 % (ref 11.9–14.6)
GLOBULIN SER CALC-MCNC: 2.5 G/DL (ref 2.3–3.5)
GLUCOSE SERPL-MCNC: 88 MG/DL (ref 70–99)
HCT VFR BLD AUTO: 40.1 % (ref 35.8–46.3)
HDLC SERPL-MCNC: 62 MG/DL (ref 40–60)
HDLC SERPL: 3.2 (ref 0–5)
HGB BLD-MCNC: 13.5 G/DL (ref 11.7–15.4)
LDLC SERPL CALC-MCNC: 121 MG/DL (ref 0–100)
MCH RBC QN AUTO: 34.2 PG (ref 26.1–32.9)
MCHC RBC AUTO-ENTMCNC: 33.7 G/DL (ref 31.4–35)
MCV RBC AUTO: 101.5 FL (ref 82–102)
NRBC # BLD: 0 K/UL (ref 0–0.2)
PLATELET # BLD AUTO: 190 K/UL (ref 150–450)
PMV BLD AUTO: 10.6 FL (ref 9.4–12.3)
POTASSIUM SERPL-SCNC: 4.3 MMOL/L (ref 3.5–5.1)
PROT SERPL-MCNC: 6.8 G/DL (ref 6.3–8.2)
RBC # BLD AUTO: 3.95 M/UL (ref 4.05–5.2)
SODIUM SERPL-SCNC: 142 MMOL/L (ref 136–145)
TRIGL SERPL-MCNC: 78 MG/DL (ref 0–150)
TSH W FREE THYROID IF ABNORMAL: 3.56 UIU/ML (ref 0.27–4.2)
VLDLC SERPL CALC-MCNC: 16 MG/DL (ref 6–23)
WBC # BLD AUTO: 3.7 K/UL (ref 4.3–11.1)

## 2025-01-17 RX ORDER — PHENAZOPYRIDINE HYDROCHLORIDE 100 MG/1
100 TABLET, FILM COATED ORAL 3 TIMES DAILY PRN
Qty: 30 TABLET | Refills: 2 | Status: CANCELLED | OUTPATIENT
Start: 2025-01-17 | End: 2026-01-17

## 2025-01-17 RX ORDER — FAMOTIDINE 20 MG/1
20 TABLET, FILM COATED ORAL 2 TIMES DAILY PRN
Qty: 180 TABLET | Refills: 1 | Status: SHIPPED | OUTPATIENT
Start: 2025-01-17

## 2025-01-17 RX ORDER — LORAZEPAM 0.5 MG/1
0.5 TABLET ORAL EVERY 6 HOURS PRN
Qty: 30 TABLET | Refills: 2 | Status: SHIPPED | OUTPATIENT
Start: 2025-01-17 | End: 2025-04-17

## 2025-01-17 SDOH — ECONOMIC STABILITY: FOOD INSECURITY: WITHIN THE PAST 12 MONTHS, THE FOOD YOU BOUGHT JUST DIDN'T LAST AND YOU DIDN'T HAVE MONEY TO GET MORE.: NEVER TRUE

## 2025-01-17 SDOH — ECONOMIC STABILITY: FOOD INSECURITY: WITHIN THE PAST 12 MONTHS, YOU WORRIED THAT YOUR FOOD WOULD RUN OUT BEFORE YOU GOT MONEY TO BUY MORE.: NEVER TRUE

## 2025-01-17 ASSESSMENT — LIFESTYLE VARIABLES
HOW OFTEN DO YOU HAVE A DRINK CONTAINING ALCOHOL: NEVER
HOW MANY STANDARD DRINKS CONTAINING ALCOHOL DO YOU HAVE ON A TYPICAL DAY: PATIENT DOES NOT DRINK

## 2025-01-17 ASSESSMENT — PATIENT HEALTH QUESTIONNAIRE - PHQ9
SUM OF ALL RESPONSES TO PHQ QUESTIONS 1-9: 0
1. LITTLE INTEREST OR PLEASURE IN DOING THINGS: NOT AT ALL
2. FEELING DOWN, DEPRESSED OR HOPELESS: NOT AT ALL
SUM OF ALL RESPONSES TO PHQ9 QUESTIONS 1 & 2: 0
SUM OF ALL RESPONSES TO PHQ QUESTIONS 1-9: 0

## 2025-01-17 NOTE — PATIENT INSTRUCTIONS
cholesterol. If you think you may have a problem with alcohol or drug use, talk to your doctor.   Medicines    Take your medicines exactly as prescribed. Call your doctor if you think you are having a problem with your medicine.     If your doctor recommends aspirin, take the amount directed each day. Make sure you take aspirin and not another kind of pain reliever, such as acetaminophen (Tylenol).   When should you call for help?   Call 911 if you have symptoms of a heart attack. These may include:    Chest pain or pressure, or a strange feeling in the chest.     Sweating.     Shortness of breath.     Pain, pressure, or a strange feeling in the back, neck, jaw, or upper belly or in one or both shoulders or arms.     Lightheadedness or sudden weakness.     A fast or irregular heartbeat.   After you call 911, the  may tell you to chew 1 adult-strength or 2 to 4 low-dose aspirin. Wait for an ambulance. Do not try to drive yourself.  Watch closely for changes in your health, and be sure to contact your doctor if you have any problems.  Where can you learn more?  Go to https://www.Linkedwith.net/patientEd and enter F075 to learn more about \"A Healthy Heart: Care Instructions.\"  Current as of: July 31, 2024  Content Version: 14.3  © 2024 [employer].   Care instructions adapted under license by [employer]. If you have questions about a medical condition or this instruction, always ask your healthcare professional. [employer] disclaims any warranty or liability for your use of this information.    Personalized Preventive Plan for Yue Vigil - 1/17/2025  Medicare offers a range of preventive health benefits. Some of the tests and screenings are paid in full while other may be subject to a deductible, co-insurance, and/or copay.  Some of these benefits include a comprehensive review of your medical history including lifestyle, illnesses that may run in your family, and various

## 2025-01-17 NOTE — PROGRESS NOTES
Medicare Annual Wellness Visit    Yue Sharmacarole Vigil is here for Medicare AWV (Welcome to) and Anxiety    Assessment & Plan  Annual Medicare AWV    Bronchiectasis  - Scheduled for a CT scan and PFT's with Pulmonary follow up  -Continue with Spiriva    Hip pain  - Intermittent hip pain   - Referral to orthopedics for further evaluation    GERD  - Occasional nausea managed with Pepcid    Hair loss  - Hair loss since age 61  - Blood work to recheck thyroid levels today    Anxiety with panic  - Continue with lorazepam on prn basis.  - Advised her how to take medication properly.  Discussed the potential side effects--including addiction--and benefits of the medication.  She is to call with any problems or concerns.  - I have reviewed the patient’s controlled substance prescription history, as maintained in the [place] prescription monitoring program, so that the prescription(s) for a controlled substance can be given.    Osteoporosis  -Continue with alendronate/weight bearing exercise/Vit D supplementation      Welcome to Medicare preventive visit  Bronchiectasis without complication (HCC)  Right hip pain  -     [facility]  Gastroesophageal reflux disease without esophagitis  -     famotidine (PEPCID) 20 MG tablet; Take 1 tablet by mouth 2 times daily as needed (Indigestion), Disp-180 tablet, R-1Normal  Alopecia  -     TSH reflex to FT4; Future  Age-related osteoporosis without current pathological fracture  Panic disorder without agoraphobia  -     LORazepam (ATIVAN) 0.5 MG tablet; Take 1 tablet by mouth every 6 hours as needed for Anxiety for up to 90 days. Max Daily Amount: 2 mg, Disp-30 tablet, R-2Normal  Mixed hyperlipidemia  -     Basic Metabolic Panel; Future  -     Hepatic Function Panel; Future  -     Lipid Panel; Future  -     CBC; Future    Results         Return in 6 months (on 7/17/2025), or if symptoms worsen or fail to improve.     Subjective   The following acute

## 2025-01-18 NOTE — RESULT ENCOUNTER NOTE
-Please call pt and let know:  -Her T bili is a little elevated (liver enzyme).  Has been in the past as well.  Please repeat hepatic fxn panel in one month.  -Also WBC barely decreased---has been previously as well.  Non-specific.  Please repeat CBC with hepatic fxn panel above.

## 2025-01-20 ENCOUNTER — OFFICE VISIT (OUTPATIENT)
Dept: ORTHOPEDIC SURGERY | Age: 65
End: 2025-01-20
Payer: MEDICARE

## 2025-01-20 DIAGNOSIS — R74.8 ELEVATED LIVER ENZYMES: ICD-10-CM

## 2025-01-20 DIAGNOSIS — M25.551 RIGHT HIP PAIN: Primary | ICD-10-CM

## 2025-01-20 DIAGNOSIS — D72.819 LEUKOPENIA, UNSPECIFIED TYPE: Primary | ICD-10-CM

## 2025-01-20 DIAGNOSIS — M70.61 TROCHANTERIC BURSITIS OF RIGHT HIP: ICD-10-CM

## 2025-01-20 DIAGNOSIS — M16.11 PRIMARY OSTEOARTHRITIS OF RIGHT HIP: ICD-10-CM

## 2025-01-20 PROCEDURE — 99203 OFFICE O/P NEW LOW 30 MIN: CPT | Performed by: NURSE PRACTITIONER

## 2025-01-20 PROCEDURE — 3017F COLORECTAL CA SCREEN DOC REV: CPT | Performed by: NURSE PRACTITIONER

## 2025-01-20 PROCEDURE — 20610 DRAIN/INJ JOINT/BURSA W/O US: CPT | Performed by: NURSE PRACTITIONER

## 2025-01-20 PROCEDURE — 1090F PRES/ABSN URINE INCON ASSESS: CPT | Performed by: NURSE PRACTITIONER

## 2025-01-20 PROCEDURE — 1123F ACP DISCUSS/DSCN MKR DOCD: CPT | Performed by: NURSE PRACTITIONER

## 2025-01-20 PROCEDURE — G8399 PT W/DXA RESULTS DOCUMENT: HCPCS | Performed by: NURSE PRACTITIONER

## 2025-01-20 PROCEDURE — G8420 CALC BMI NORM PARAMETERS: HCPCS | Performed by: NURSE PRACTITIONER

## 2025-01-20 PROCEDURE — G8428 CUR MEDS NOT DOCUMENT: HCPCS | Performed by: NURSE PRACTITIONER

## 2025-01-20 PROCEDURE — 1036F TOBACCO NON-USER: CPT | Performed by: NURSE PRACTITIONER

## 2025-01-20 RX ORDER — METHYLPREDNISOLONE ACETATE 80 MG/ML
80 INJECTION, SUSPENSION INTRA-ARTICULAR; INTRALESIONAL; INTRAMUSCULAR; SOFT TISSUE ONCE
Status: COMPLETED | OUTPATIENT
Start: 2025-01-20 | End: 2025-01-20

## 2025-01-20 RX ADMIN — METHYLPREDNISOLONE ACETATE 80 MG: 80 INJECTION, SUSPENSION INTRA-ARTICULAR; INTRALESIONAL; INTRAMUSCULAR; SOFT TISSUE at 09:38

## 2025-01-20 NOTE — PROGRESS NOTES
Patient ID:    Yue Vigil  [id number]  64 y.o.  1960    Today: January 20, 2025      Chief Complaint: Right hip pain        Patient reports longstanding history of right hip pain.  The pain is predominately localized to the lateral hip and is characterized as a general ache with occasional sharp pain.  They rate the pain ranging from 3-8 on the pain scale occurring in a cyclical fashion with periods of acute exacerbation. Symptoms are exacerbated with attempting to sleep on the hip, attempting to ascend stairs, and sitting for long periods of time which results in lateral hip pain. Patient denies significant anterior groin pain and denies significant issues with attempting to put on socks and shoes or when attempting to get into or out of a vehicle. No numbness of tingling going down the extremity.  Patient has attempted prior conservative treatment including Activity modification and NSAIDS.      Past Medical History:  Past Medical History:   Diagnosis Date    Anemia     Anxiety disorder     B12 deficiency     Bronchiectasis without complication (HCC) 10/3/2018    managed with inhaler    Depression     Gastroesophageal reflux     Hashimoto's disease 8/16/2017    pt denies (3/15/2023)    Hx of blood clots     resolved, could not tolerate meds to treat; resolved with \"compresses\". Happened \"years ago\"per pt    Irritable bowel syndrome     Irritable bowel syndrome with diarrhea     Mass of lung     Menopause     50s    Mild intermittent asthma without complication 10/3/2018    Osteopenia        Past Surgical History:  Past Surgical History:   Procedure Laterality Date    APPENDECTOMY      BRONCHOSCOPY N/A 03/28/2023    BRONCHOSCOPY  performed by Matthias Bocanegra MD at [facility] ENDOSCOPY    BRONCHOSCOPY N/A 03/28/2023    BRONCHOSCOPY W/EBUS FNA ROBOTIC/MONARCH performed by Matthias Bocanegra MD at [facility] ENDOSCOPY    CHOLECYSTECTOMY      CT NEEDLE BIOPSY LUNG PERCUTANEOUS W IMAGING GUIDANCE  04/10/2023    CT

## 2025-01-20 NOTE — PATIENT INSTRUCTIONS
stronger. Do these as directed.  You can slowly return to the activity that caused the pain, but do it with less effort until you can do it without pain or swelling. Be sure to warm up before and stretch after you do the activity.  When should you call for help?   Call your doctor now or seek immediate medical care if:    You have new or worse symptoms of infection, such as:  Increased pain, swelling, warmth, or redness.  Red streaks leading from the area.  Pus draining from the area.  A fever.   Watch closely for changes in your health, and be sure to contact your doctor if:    You do not get better as expected.   Where can you learn more?  Go to https://www.WonderHowTo.net/patientEd and enter Q970 to learn more about \"Bursitis: Care Instructions.\"  Current as of: July 31, 2024  Content Version: 14.3  © 2024 [employer].   Care instructions adapted under license by "[employer]. If you have questions about a medical condition or this instruction, always ask your healthcare professional. [employer] disclaims any warranty or liability for your use of this information.

## 2025-01-28 ENCOUNTER — NURSE ONLY (OUTPATIENT)
Dept: INTERNAL MEDICINE CLINIC | Facility: CLINIC | Age: 65
End: 2025-01-28
Payer: MEDICARE

## 2025-01-28 DIAGNOSIS — E53.8 B12 DEFICIENCY: Primary | ICD-10-CM

## 2025-01-28 PROCEDURE — 96372 THER/PROPH/DIAG INJ SC/IM: CPT | Performed by: FAMILY MEDICINE

## 2025-01-28 RX ORDER — CYANOCOBALAMIN 1000 UG/ML
1000 INJECTION, SOLUTION INTRAMUSCULAR; SUBCUTANEOUS ONCE
Status: COMPLETED | OUTPATIENT
Start: 2025-01-28 | End: 2025-01-28

## 2025-01-28 RX ADMIN — CYANOCOBALAMIN 1000 MCG: 1000 INJECTION, SOLUTION INTRAMUSCULAR; SUBCUTANEOUS at 09:10

## 2025-02-11 ENCOUNTER — HOSPITAL ENCOUNTER (OUTPATIENT)
Dept: CT IMAGING | Age: 65
Discharge: HOME | End: 2025-02-14
Attending: INTERNAL MEDICINE
Payer: MEDICARE

## 2025-02-11 DIAGNOSIS — R93.89 ABNORMAL CHEST CT: ICD-10-CM

## 2025-02-11 PROCEDURE — 71250 CT THORAX DX C-: CPT

## 2025-02-18 ENCOUNTER — LAB (OUTPATIENT)
Dept: INTERNAL MEDICINE CLINIC | Facility: CLINIC | Age: 65
End: 2025-02-18

## 2025-02-18 DIAGNOSIS — D72.819 LEUKOPENIA, UNSPECIFIED TYPE: ICD-10-CM

## 2025-02-18 DIAGNOSIS — R74.8 ELEVATED LIVER ENZYMES: ICD-10-CM

## 2025-02-18 LAB
ALBUMIN SERPL-MCNC: 4 G/DL (ref 3.2–4.6)
ALBUMIN/GLOB SERPL: 1.6 (ref 1–1.9)
ALP SERPL-CCNC: 54 U/L (ref 35–104)
ALT SERPL-CCNC: 13 U/L (ref 8–45)
AST SERPL-CCNC: 19 U/L (ref 15–37)
BILIRUB DIRECT SERPL-MCNC: 0.3 MG/DL (ref 0–0.4)
BILIRUB SERPL-MCNC: 1.4 MG/DL (ref 0–1.2)
ERYTHROCYTE [DISTWIDTH] IN BLOOD BY AUTOMATED COUNT: 12.5 % (ref 11.9–14.6)
GLOBULIN SER CALC-MCNC: 2.6 G/DL (ref 2.3–3.5)
HCT VFR BLD AUTO: 39.8 % (ref 35.8–46.3)
HGB BLD-MCNC: 13 G/DL (ref 11.7–15.4)
MCH RBC QN AUTO: 33.9 PG (ref 26.1–32.9)
MCHC RBC AUTO-ENTMCNC: 32.7 G/DL (ref 31.4–35)
MCV RBC AUTO: 103.9 FL (ref 82–102)
NRBC # BLD: 0 K/UL (ref 0–0.2)
PLATELET # BLD AUTO: 177 K/UL (ref 150–450)
PMV BLD AUTO: 10.7 FL (ref 9.4–12.3)
PROT SERPL-MCNC: 6.6 G/DL (ref 6.3–8.2)
RBC # BLD AUTO: 3.83 M/UL (ref 4.05–5.2)
WBC # BLD AUTO: 3.2 K/UL (ref 4.3–11.1)

## 2025-02-19 DIAGNOSIS — D72.819 LEUKOPENIA, UNSPECIFIED TYPE: Primary | ICD-10-CM

## 2025-02-19 NOTE — RESULT ENCOUNTER NOTE
-Please call pt and let know:  -WBC remains a little decreased--pls get in with hematology for further evaluation and workup as indicated.

## 2025-02-21 ENCOUNTER — NURSE ONLY (OUTPATIENT)
Dept: PULMONOLOGY | Age: 65
End: 2025-02-21

## 2025-02-21 DIAGNOSIS — R91.1 LUNG NODULE: Primary | ICD-10-CM

## 2025-02-21 LAB
FEV 1 , POC: 2.17 L
FEV1 % PRED, POC: 97 %
FEV1/FVC, POC: NORMAL
FVC % PRED, POC: 91 %
FVC, POC: NORMAL

## 2025-02-21 ASSESSMENT — PULMONARY FUNCTION TESTS
FEV1_PERCENT_PREDICTED_POC: 97
FVC_PERCENT_PREDICTED_POC: 91

## 2025-02-25 ENCOUNTER — NURSE ONLY (OUTPATIENT)
Dept: INTERNAL MEDICINE CLINIC | Facility: CLINIC | Age: 65
End: 2025-02-25
Payer: MEDICARE

## 2025-02-25 DIAGNOSIS — E53.8 B12 DEFICIENCY: Primary | ICD-10-CM

## 2025-02-25 PROCEDURE — 96372 THER/PROPH/DIAG INJ SC/IM: CPT | Performed by: FAMILY MEDICINE

## 2025-02-25 RX ORDER — CYANOCOBALAMIN 1000 UG/ML
1000 INJECTION, SOLUTION INTRAMUSCULAR; SUBCUTANEOUS ONCE
Status: COMPLETED | OUTPATIENT
Start: 2025-02-25 | End: 2025-02-25

## 2025-02-25 RX ADMIN — CYANOCOBALAMIN 1000 MCG: 1000 INJECTION, SOLUTION INTRAMUSCULAR; SUBCUTANEOUS at 09:09

## 2025-02-27 ENCOUNTER — OFFICE VISIT (OUTPATIENT)
Dept: PULMONOLOGY | Age: 65
End: 2025-02-27
Payer: MEDICARE

## 2025-02-27 VITALS
SYSTOLIC BLOOD PRESSURE: 143 MMHG | HEART RATE: 61 BPM | WEIGHT: 116 LBS | HEIGHT: 63 IN | DIASTOLIC BLOOD PRESSURE: 84 MMHG | OXYGEN SATURATION: 100 % | BODY MASS INDEX: 20.55 KG/M2 | RESPIRATION RATE: 14 BRPM

## 2025-02-27 DIAGNOSIS — R91.8 LUNG NODULES: Primary | ICD-10-CM

## 2025-02-27 DIAGNOSIS — J45.909 UNCOMPLICATED ASTHMA, UNSPECIFIED ASTHMA SEVERITY, UNSPECIFIED WHETHER PERSISTENT: ICD-10-CM

## 2025-02-27 PROCEDURE — 99214 OFFICE O/P EST MOD 30 MIN: CPT | Performed by: INTERNAL MEDICINE

## 2025-02-27 PROCEDURE — 1090F PRES/ABSN URINE INCON ASSESS: CPT | Performed by: INTERNAL MEDICINE

## 2025-02-27 PROCEDURE — 3017F COLORECTAL CA SCREEN DOC REV: CPT | Performed by: INTERNAL MEDICINE

## 2025-02-27 PROCEDURE — G8427 DOCREV CUR MEDS BY ELIG CLIN: HCPCS | Performed by: INTERNAL MEDICINE

## 2025-02-27 PROCEDURE — G8420 CALC BMI NORM PARAMETERS: HCPCS | Performed by: INTERNAL MEDICINE

## 2025-02-27 PROCEDURE — G8399 PT W/DXA RESULTS DOCUMENT: HCPCS | Performed by: INTERNAL MEDICINE

## 2025-02-27 PROCEDURE — 1123F ACP DISCUSS/DSCN MKR DOCD: CPT | Performed by: INTERNAL MEDICINE

## 2025-02-27 PROCEDURE — 1036F TOBACCO NON-USER: CPT | Performed by: INTERNAL MEDICINE

## 2025-02-27 NOTE — PROGRESS NOTES
suspect some of these changes will be chronic and will not improve and we will probably will do further follow-up 8 to 10 months      2. Abnormal chest CT    -Pt has an extensive history and all of this was reviewed prior to her appt today. Pt was hospitalized 10/9-10/21/16 after a fall in the shower resulting in mulitple rib fractures, small L pleural effusion, and pt was found to have hemothorax. She was seen by thoracic surgery and underwent VATS with evacuation of hemothorax and cryoablation of intercostal nerves III-XI on 10/17/16. She had repeat chest CT 2/17/17 with small indeterminate liver lesions and 1 area of scarring/lung nodule in the LLL. She had repeat CT 2/21/18 with increased mucoid impaction injury and nodularity in RUL, RML, and GIUSEPPE felt to be inflammatory. She was then referred to Dr. Jones at [facility]. She was felt to have moderate persistent asthma in the setting of bronchiectasis. AFB culture was negative at that time.       3. Uncomplicated asthma, unspecified asthma severity, unspecified whether persistent  -She is on Spiriva as she did not like Breo and she prefers Spiriva.  She does not use her rescue inhaler at all.  - She feels Spiriva works for her and she wish to continue to use it     Orders Placed This Encounter   Medications    levalbuterol (XOPENEX HFA) 45 MCG/ACT inhaler     Sig: Inhale 1 puff into the lungs every 4 hours as needed for Wheezing     Dispense:  1 each     Refill:  3     No orders of the defined types were placed in this encounter.    Follow-up and Dispositions    Return in about 6 months (around 8/27/2025).         Robyn Begum MD    No specialty comments available.  No problem-specific Assessment & Plan notes found for this encounter.           Total time for encounter on day of encounter was 30 minutes.  This time includes chart prep, review of tests/procedures, review of other provider's notes, documentation and counseling patient regarding disease

## 2025-02-28 ENCOUNTER — TELEPHONE (OUTPATIENT)
Dept: PULMONOLOGY | Age: 65
End: 2025-02-28

## 2025-02-28 NOTE — TELEPHONE ENCOUNTER
Patient  says that DR. Begum was going to prescribe her some new type of Albuteral inhaler .to go with her Spiriva .  She has not heard from her  pharmacy . Ask if Dr. Begum was still going to send it in

## 2025-03-02 RX ORDER — LEVALBUTEROL TARTRATE 45 UG/1
1 AEROSOL, METERED ORAL EVERY 4 HOURS PRN
Qty: 1 EACH | Refills: 3 | Status: SHIPPED | OUTPATIENT
Start: 2025-03-02 | End: 2026-03-02

## 2025-03-04 NOTE — PROGRESS NOTES
NEW PATIENT ABSTRACT            Referral Diagnosis: Leukopenia    Referring Provider: Bakari Hilliard DO    Primary Care Provider: Bakari Hilliard DO    Presenting Symptoms: None    Family History of Cancer: Cancer-related family history includes Breast Cancer in her maternal aunt. There is no history of Uterine Cancer, Ovarian Cancer, or Colon Cancer.    Past Medical History:   Past Medical History:   Diagnosis Date    Anemia     Anxiety disorder     B12 deficiency     Bronchiectasis without complication (HCC) 10/3/2018    managed with inhaler    Depression     Gastroesophageal reflux     Hashimoto's disease 8/16/2017    pt denies (3/15/2023)    Hx of blood clots     resolved, could not tolerate meds to treat; resolved with \"compresses\". Happened \"years ago\"per pt    Irritable bowel syndrome     Irritable bowel syndrome with diarrhea     Mass of lung     Menopause     50s    Mild intermittent asthma without complication 10/3/2018    Osteopenia        Family/ Social/ Medical/ Surgical History Updated in Epic: Yes    Chronological History of Pertinent Events (From Onset of Presenting Symptoms):    2/18/25- WBC: 3.2, RBC: 3.83, Hgb: 13.0, Hct: 39.8, Plt: 177  1/17/25- WBC: 3.7, RBC: 3.95, Hgb: 13.5, Hct: 40.1, Plt: 190    2/11/25- CT Chest wo contrast-A couple nodular foci of the superior segment of the right lower  lobe which are particularly more conspicuous when compared to the 5 March 2024  examination. Recommend follow-up examination in 3 months.     The balance of the examination is similar to the prior predominantly  demonstrating chronic changes.    Record located in Epic.      Pertinent Notes from Referring Provider: None    Other Pertinent Information: None

## 2025-03-14 ENCOUNTER — OFFICE VISIT (OUTPATIENT)
Dept: ONCOLOGY | Age: 65
End: 2025-03-14

## 2025-03-14 ENCOUNTER — HOSPITAL ENCOUNTER (OUTPATIENT)
Dept: LAB | Age: 65
Discharge: HOME | End: 2025-03-14
Payer: MEDICARE

## 2025-03-14 VITALS
RESPIRATION RATE: 18 BRPM | SYSTOLIC BLOOD PRESSURE: 139 MMHG | TEMPERATURE: 98.7 F | HEIGHT: 63 IN | BODY MASS INDEX: 20.87 KG/M2 | DIASTOLIC BLOOD PRESSURE: 89 MMHG | OXYGEN SATURATION: 98 % | WEIGHT: 117.8 LBS | HEART RATE: 83 BPM

## 2025-03-14 DIAGNOSIS — R74.8 ABNORMAL LEVELS OF OTHER SERUM ENZYMES: ICD-10-CM

## 2025-03-14 DIAGNOSIS — E55.9 VITAMIN D DEFICIENCY: ICD-10-CM

## 2025-03-14 DIAGNOSIS — D72.819 LEUKOPENIA, UNSPECIFIED TYPE: ICD-10-CM

## 2025-03-14 DIAGNOSIS — D75.89 MACROCYTOSIS: ICD-10-CM

## 2025-03-14 DIAGNOSIS — R53.83 OTHER FATIGUE: ICD-10-CM

## 2025-03-14 DIAGNOSIS — D72.819 LEUKOPENIA, UNSPECIFIED TYPE: Primary | ICD-10-CM

## 2025-03-14 DIAGNOSIS — D64.9 ANEMIA, UNSPECIFIED TYPE: ICD-10-CM

## 2025-03-14 LAB
25(OH)D3 SERPL-MCNC: 51.2 NG/ML (ref 30–100)
ALBUMIN SERPL-MCNC: 4.1 G/DL (ref 3.2–4.6)
ALBUMIN/GLOB SERPL: 1.5 (ref 1–1.9)
ALP SERPL-CCNC: 59 U/L (ref 35–104)
ALT SERPL-CCNC: 12 U/L (ref 8–45)
ANION GAP SERPL CALC-SCNC: 12 MMOL/L (ref 7–16)
AST SERPL-CCNC: 23 U/L (ref 15–37)
BASOPHILS # BLD: 0.05 K/UL (ref 0–0.2)
BASOPHILS NFR BLD: 1.2 % (ref 0–2)
BILIRUB SERPL-MCNC: 1.9 MG/DL (ref 0–1.2)
BUN SERPL-MCNC: 12 MG/DL (ref 8–23)
CALCIUM SERPL-MCNC: 9.5 MG/DL (ref 8.8–10.2)
CHLORIDE SERPL-SCNC: 105 MMOL/L (ref 98–107)
CO2 SERPL-SCNC: 24 MMOL/L (ref 20–29)
CREAT SERPL-MCNC: 0.88 MG/DL (ref 0.6–1.1)
DIFFERENTIAL METHOD BLD: ABNORMAL
EOSINOPHIL # BLD: 0.04 K/UL (ref 0–0.8)
EOSINOPHIL NFR BLD: 0.9 % (ref 0.5–7.8)
ERYTHROCYTE [DISTWIDTH] IN BLOOD BY AUTOMATED COUNT: 12.1 % (ref 11.9–14.6)
FERRITIN SERPL-MCNC: 85 NG/ML (ref 8–388)
FOLATE SERPL-MCNC: 16.4 NG/ML (ref 3.1–17.5)
GGT SERPL-CCNC: 7 U/L (ref 5–36)
GLOBULIN SER CALC-MCNC: 2.7 G/DL (ref 2.3–3.5)
GLUCOSE SERPL-MCNC: 95 MG/DL (ref 70–99)
HAV IGM SER QL: NONREACTIVE
HBV CORE IGM SER QL: NONREACTIVE
HBV SURFACE AG SER QL: NONREACTIVE
HCT VFR BLD AUTO: 39 % (ref 35.8–46.3)
HCV AB SER QL: NONREACTIVE
HGB BLD-MCNC: 13 G/DL (ref 11.7–15.4)
HGB RETIC QN AUTO: 37 PG (ref 29–35)
HIV 1+2 AB+HIV1 P24 AG SERPL QL IA: NONREACTIVE
HIV 1/2 RESULT COMMENT: NORMAL
IMM GRANULOCYTES # BLD AUTO: 0 K/UL (ref 0–0.5)
IMM GRANULOCYTES NFR BLD AUTO: 0 % (ref 0–5)
IMM RETICS NFR: 6 % (ref 3–15.9)
LYMPHOCYTES # BLD: 1.42 K/UL (ref 0.5–4.6)
LYMPHOCYTES NFR BLD: 33.4 % (ref 13–44)
MCH RBC QN AUTO: 33.9 PG (ref 26.1–32.9)
MCHC RBC AUTO-ENTMCNC: 33.3 G/DL (ref 31.4–35)
MCV RBC AUTO: 101.6 FL (ref 82–102)
MONOCYTES # BLD: 0.22 K/UL (ref 0.1–1.3)
MONOCYTES NFR BLD: 5.2 % (ref 4–12)
NEUTS SEG # BLD: 2.52 K/UL (ref 1.7–8.2)
NEUTS SEG NFR BLD: 59.3 % (ref 43–78)
NRBC # BLD: 0 K/UL (ref 0–0.2)
PLATELET # BLD AUTO: 179 K/UL (ref 150–450)
PMV BLD AUTO: 10.2 FL (ref 9.4–12.3)
POTASSIUM SERPL-SCNC: 3.8 MMOL/L (ref 3.5–5.1)
PROT SERPL-MCNC: 6.8 G/DL (ref 6.3–8.2)
RBC # BLD AUTO: 3.84 M/UL (ref 4.05–5.2)
RETICS # AUTO: 0.04 M/UL (ref 0.03–0.1)
RETICS/RBC NFR AUTO: 1 % (ref 0.3–2)
SODIUM SERPL-SCNC: 141 MMOL/L (ref 136–145)
VIT B12 SERPL-MCNC: 720 PG/ML (ref 193–986)
WBC # BLD AUTO: 4.3 K/UL (ref 4.3–11.1)

## 2025-03-14 PROCEDURE — 82728 ASSAY OF FERRITIN: CPT

## 2025-03-14 PROCEDURE — 82306 VITAMIN D 25 HYDROXY: CPT

## 2025-03-14 PROCEDURE — 36415 COLL VENOUS BLD VENIPUNCTURE: CPT

## 2025-03-14 PROCEDURE — 82977 ASSAY OF GGT: CPT

## 2025-03-14 PROCEDURE — 82607 VITAMIN B-12: CPT

## 2025-03-14 PROCEDURE — 80074 ACUTE HEPATITIS PANEL: CPT

## 2025-03-14 PROCEDURE — 85046 RETICYTE/HGB CONCENTRATE: CPT

## 2025-03-14 PROCEDURE — 82525 ASSAY OF COPPER: CPT

## 2025-03-14 PROCEDURE — 87389 HIV-1 AG W/HIV-1&-2 AB AG IA: CPT

## 2025-03-14 PROCEDURE — 82787 IGG 1 2 3 OR 4 EACH: CPT

## 2025-03-14 PROCEDURE — 84425 ASSAY OF VITAMIN B-1: CPT

## 2025-03-14 PROCEDURE — 80053 COMPREHEN METABOLIC PANEL: CPT

## 2025-03-14 PROCEDURE — 85025 COMPLETE CBC W/AUTO DIFF WBC: CPT

## 2025-03-14 PROCEDURE — 86038 ANTINUCLEAR ANTIBODIES: CPT

## 2025-03-14 PROCEDURE — 82784 ASSAY IGA/IGD/IGG/IGM EACH: CPT

## 2025-03-14 PROCEDURE — 82746 ASSAY OF FOLIC ACID SERUM: CPT

## 2025-03-14 PROCEDURE — 0077U IG PARAPROTEIN QUAL BLD/UR: CPT

## 2025-03-14 ASSESSMENT — PATIENT HEALTH QUESTIONNAIRE - PHQ9
SUM OF ALL RESPONSES TO PHQ QUESTIONS 1-9: 0
1. LITTLE INTEREST OR PLEASURE IN DOING THINGS: NOT AT ALL
SUM OF ALL RESPONSES TO PHQ QUESTIONS 1-9: 0
2. FEELING DOWN, DEPRESSED OR HOPELESS: NOT AT ALL

## 2025-03-14 NOTE — PATIENT INSTRUCTIONS
Patient Information from Today's Visit    The members of your [facility] are listed below:    Physician Provider: Carlos Tee Medical Oncologist  Advanced Practice Clinician: Belinda Castellon NP  Registered Nurse: Elif RANDALL   Navigator: N/A  Medical Assistant: Patricia MAHONEY MA and Ellen CROCKETT MA  : Yeny CELESTIN   Supportive Care Services: Alejandro DUENAS LMSW    Diagnosis: New Patient --Leukopenia      Follow Up Instructions:     -We will draw labs on you today to check your blood work.   -We will recheck your labs and follow up in April.      Treatment Summary has been discussed and given to patient:N/A      Current Labs: N/A    Please refer to After Visit Summary or MyChart for upcoming appointment information. Please call our office for rescheduling needs at least 24 hours before your scheduled appointment time.If you have any questions regarding your upcoming schedule please reach out to your care team through [facility] or call [phone].     Please notify your assigned Nurse Navigator of any unplanned hospital admissions or Emergency Room visits within 24 hours of discharge.    -------------------------------------------------------------------------------------------------------------------  Please call our office at [phone] if you have any  of the following symptoms:   Fever of 100.5 or greater  Chills  Shortness of breath  Swelling or pain in one leg    After office hours an answering service is available and will contact a provider for emergencies or if you are experiencing any of the above symptoms.    ELIF PATEL RN

## 2025-03-15 LAB
ANA SER QL: NEGATIVE
COPPER SERPL-MCNC: 82 UG/DL (ref 80–158)

## 2025-03-16 LAB
IGG SERPL-MCNC: 953 MG/DL (ref 586–1602)
IGG1 SER-MCNC: 496 MG/DL (ref 248–810)
IGG2 SER-MCNC: 275 MG/DL (ref 130–555)
IGG3 SER-MCNC: 37 MG/DL (ref 15–102)
IGG4 SER-MCNC: 36 MG/DL (ref 2–96)

## 2025-03-17 LAB — VIT B1 BLD-SCNC: 102.3 NMOL/L (ref 66.5–200)

## 2025-03-19 LAB — IMMUNOLOGIST REVIEW: NORMAL

## 2025-03-25 ENCOUNTER — CLINICAL SUPPORT (OUTPATIENT)
Dept: INTERNAL MEDICINE CLINIC | Facility: CLINIC | Age: 65
End: 2025-03-25
Payer: MEDICARE

## 2025-03-25 DIAGNOSIS — E53.8 B12 DEFICIENCY: Primary | ICD-10-CM

## 2025-03-25 PROCEDURE — 96372 THER/PROPH/DIAG INJ SC/IM: CPT | Performed by: INTERNAL MEDICINE

## 2025-03-25 RX ORDER — CYANOCOBALAMIN 1000 UG/ML
1000 INJECTION, SOLUTION INTRAMUSCULAR; SUBCUTANEOUS ONCE
Status: COMPLETED | OUTPATIENT
Start: 2025-03-25 | End: 2025-03-25

## 2025-03-25 RX ADMIN — CYANOCOBALAMIN 1000 MCG: 1000 INJECTION, SOLUTION INTRAMUSCULAR; SUBCUTANEOUS at 09:08

## 2025-04-07 DIAGNOSIS — D72.819 LEUKOPENIA, UNSPECIFIED TYPE: Primary | ICD-10-CM

## 2025-04-07 DIAGNOSIS — D75.89 MACROCYTOSIS: ICD-10-CM

## 2025-04-09 ENCOUNTER — OFFICE VISIT (OUTPATIENT)
Dept: ONCOLOGY | Age: 65
End: 2025-04-09

## 2025-04-09 ENCOUNTER — HOSPITAL ENCOUNTER (OUTPATIENT)
Dept: LAB | Age: 65
Discharge: HOME | End: 2025-04-09
Payer: MEDICARE

## 2025-04-09 VITALS
RESPIRATION RATE: 16 BRPM | TEMPERATURE: 97.8 F | SYSTOLIC BLOOD PRESSURE: 129 MMHG | HEIGHT: 63 IN | HEART RATE: 56 BPM | BODY MASS INDEX: 21.12 KG/M2 | WEIGHT: 119.2 LBS | OXYGEN SATURATION: 100 % | DIASTOLIC BLOOD PRESSURE: 81 MMHG

## 2025-04-09 DIAGNOSIS — D72.819 LEUKOPENIA, UNSPECIFIED TYPE: Primary | ICD-10-CM

## 2025-04-09 DIAGNOSIS — D72.819 LEUKOPENIA, UNSPECIFIED TYPE: ICD-10-CM

## 2025-04-09 DIAGNOSIS — E55.9 VITAMIN D DEFICIENCY: ICD-10-CM

## 2025-04-09 DIAGNOSIS — D75.89 MACROCYTOSIS: ICD-10-CM

## 2025-04-09 DIAGNOSIS — K21.9 CHRONIC GERD: ICD-10-CM

## 2025-04-09 LAB
ALBUMIN SERPL-MCNC: 4.1 G/DL (ref 3.2–4.6)
ALBUMIN/GLOB SERPL: 1.4 (ref 1–1.9)
ALP SERPL-CCNC: 58 U/L (ref 35–104)
ALT SERPL-CCNC: 12 U/L (ref 8–45)
ANION GAP SERPL CALC-SCNC: 9 MMOL/L (ref 7–16)
AST SERPL-CCNC: 22 U/L (ref 15–37)
BASOPHILS # BLD: 0.05 K/UL (ref 0–0.2)
BASOPHILS NFR BLD: 1.3 % (ref 0–2)
BILIRUB SERPL-MCNC: 1.8 MG/DL (ref 0–1.2)
BUN SERPL-MCNC: 10 MG/DL (ref 8–23)
CALCIUM SERPL-MCNC: 9.7 MG/DL (ref 8.8–10.2)
CHLORIDE SERPL-SCNC: 106 MMOL/L (ref 98–107)
CO2 SERPL-SCNC: 24 MMOL/L (ref 20–29)
CREAT SERPL-MCNC: 0.83 MG/DL (ref 0.6–1.1)
DIFFERENTIAL METHOD BLD: ABNORMAL
EOSINOPHIL # BLD: 0.07 K/UL (ref 0–0.8)
EOSINOPHIL NFR BLD: 1.8 % (ref 0.5–7.8)
ERYTHROCYTE [DISTWIDTH] IN BLOOD BY AUTOMATED COUNT: 12 % (ref 11.9–14.6)
GLOBULIN SER CALC-MCNC: 3 G/DL (ref 2.3–3.5)
GLUCOSE SERPL-MCNC: 96 MG/DL (ref 70–99)
HCT VFR BLD AUTO: 38.9 % (ref 35.8–46.3)
HGB BLD-MCNC: 13.3 G/DL (ref 11.7–15.4)
IMM GRANULOCYTES # BLD AUTO: 0 K/UL (ref 0–0.5)
IMM GRANULOCYTES NFR BLD AUTO: 0 % (ref 0–5)
LYMPHOCYTES # BLD: 1.39 K/UL (ref 0.5–4.6)
LYMPHOCYTES NFR BLD: 35 % (ref 13–44)
MCH RBC QN AUTO: 34.4 PG (ref 26.1–32.9)
MCHC RBC AUTO-ENTMCNC: 34.2 G/DL (ref 31.4–35)
MCV RBC AUTO: 100.5 FL (ref 82–102)
MONOCYTES # BLD: 0.27 K/UL (ref 0.1–1.3)
MONOCYTES NFR BLD: 6.8 % (ref 4–12)
NEUTS SEG # BLD: 2.19 K/UL (ref 1.7–8.2)
NEUTS SEG NFR BLD: 55.1 % (ref 43–78)
NRBC # BLD: 0 K/UL (ref 0–0.2)
PLATELET # BLD AUTO: 179 K/UL (ref 150–450)
PMV BLD AUTO: 9.9 FL (ref 9.4–12.3)
POTASSIUM SERPL-SCNC: 4.1 MMOL/L (ref 3.5–5.1)
PROT SERPL-MCNC: 7 G/DL (ref 6.3–8.2)
RBC # BLD AUTO: 3.87 M/UL (ref 4.05–5.2)
SODIUM SERPL-SCNC: 139 MMOL/L (ref 136–145)
WBC # BLD AUTO: 4 K/UL (ref 4.3–11.1)

## 2025-04-09 PROCEDURE — 36415 COLL VENOUS BLD VENIPUNCTURE: CPT

## 2025-04-09 PROCEDURE — 85025 COMPLETE CBC W/AUTO DIFF WBC: CPT

## 2025-04-09 PROCEDURE — 80053 COMPREHEN METABOLIC PANEL: CPT

## 2025-04-09 NOTE — PATIENT INSTRUCTIONS
Patient Information from Today's Visit    The members of your [facility] are listed below:    Physician Provider: Carlos Tee Medical Oncologist  Advanced Practice Clinician: Belinda Castellon NP  Registered Nurse: Elif RANDALL   Navigator: N/A  Medical Assistant: Patricia MAHONEY MA and Ellen CROCKETT MA  : Yeny CELESTIN   Supportive Care Services: Alejandro DUENAS LMSW    Diagnosis: Leukopenia      Follow Up Instructions:     -Labs reviewed.   -Continue taking your medications and vitamins as prescribed.   -We will recheck your labs and follow up in 5 months.     Treatment Summary has been discussed and given to patient:N/A      Current Labs:   Hospital Outpatient Visit on 04/09/2025   Component Date Value Ref Range Status    WBC 04/09/2025 4.0 (L)  4.3 - 11.1 K/uL Final    RBC 04/09/2025 3.87 (L)  4.05 - 5.2 M/uL Final    Hemoglobin 04/09/2025 13.3  11.7 - 15.4 g/dL Final    Hematocrit 04/09/2025 38.9  35.8 - 46.3 % Final    MCV 04/09/2025 100.5  82.0 - 102.0 FL Final    MCH 04/09/2025 34.4 (H)  26.1 - 32.9 PG Final    MCHC 04/09/2025 34.2  31.4 - 35.0 g/dL Final    RDW 04/09/2025 12.0  11.9 - 14.6 % Final    Platelets 04/09/2025 179  150 - 450 K/uL Final    MPV 04/09/2025 9.9  9.4 - 12.3 FL Final    nRBC 04/09/2025 0.00  0.0 - 0.2 K/uL Final    **Note: Absolute NRBC parameter is now reported with Hemogram**    Neutrophils % 04/09/2025 55.1  43.0 - 78.0 % Final    Lymphocytes % 04/09/2025 35.0  13.0 - 44.0 % Final    Monocytes % 04/09/2025 6.8  4.0 - 12.0 % Final    Eosinophils % 04/09/2025 1.8  0.5 - 7.8 % Final    Basophils % 04/09/2025 1.3  0.0 - 2.0 % Final    Immature Granulocytes % 04/09/2025 0.0  0.0 - 5.0 % Final    Neutrophils Absolute 04/09/2025 2.19  1.70 - 8.20 K/UL Final    Lymphocytes Absolute 04/09/2025 1.39  0.50 - 4.60 K/UL Final    Monocytes Absolute 04/09/2025 0.27  0.10 - 1.30 K/UL Final    Eosinophils Absolute 04/09/2025 0.07  0.00 - 0.80 K/UL Final    Basophils Absolute 04/09/2025 0.05  0.00

## 2025-04-09 NOTE — PROGRESS NOTES
continue taking Famotidine and supplemental Vitamin D; at her next clinic visit I will re-check her CBC and CMP.        Carlos Tee MD  Hematology/BMT

## 2025-04-16 DIAGNOSIS — F41.0 PANIC DISORDER WITHOUT AGORAPHOBIA: ICD-10-CM

## 2025-04-16 RX ORDER — LORAZEPAM 0.5 MG/1
0.5 TABLET ORAL EVERY 6 HOURS PRN
Qty: 30 TABLET | Refills: 2 | Status: SHIPPED | OUTPATIENT
Start: 2025-04-16 | End: 2025-07-15

## 2025-04-16 NOTE — TELEPHONE ENCOUNTER
Patient called requesting a refill on her LORazepam (ATIVAN) 0.5 MG tablet [[id number]]    Order Details  Dose: 0.5 mg Route: Oral Frequency: EVERY 6 HOURS PRN for Anxiety   Dispense Quantity: 30 tablet Refills: 2          Sig: Take 1 tablet by mouth every 6 hours as needed for Anxiety for up to 90 days. Max Daily Amount: 2 mg   Pharmacy    [id number] - [place], [place] - [address] - P [phone] - F [phone]  [address] [place], [place] [address] [address]  Phone: [phone]  Fax: [phone]   Please Advise.

## 2025-04-22 ENCOUNTER — CLINICAL SUPPORT (OUTPATIENT)
Dept: INTERNAL MEDICINE CLINIC | Facility: CLINIC | Age: 65
End: 2025-04-22
Payer: MEDICARE

## 2025-04-22 DIAGNOSIS — E53.8 B12 DEFICIENCY: Primary | ICD-10-CM

## 2025-04-22 PROCEDURE — 96372 THER/PROPH/DIAG INJ SC/IM: CPT | Performed by: FAMILY MEDICINE

## 2025-04-22 RX ORDER — CYANOCOBALAMIN 1000 UG/ML
1000 INJECTION, SOLUTION INTRAMUSCULAR; SUBCUTANEOUS ONCE
Status: COMPLETED | OUTPATIENT
Start: 2025-04-22 | End: 2025-04-22

## 2025-04-22 RX ADMIN — CYANOCOBALAMIN 1000 MCG: 1000 INJECTION, SOLUTION INTRAMUSCULAR; SUBCUTANEOUS at 09:09

## 2025-05-20 ENCOUNTER — CLINICAL SUPPORT (OUTPATIENT)
Dept: INTERNAL MEDICINE CLINIC | Facility: CLINIC | Age: 65
End: 2025-05-20
Payer: MEDICARE

## 2025-05-20 DIAGNOSIS — E53.8 B12 DEFICIENCY: Primary | ICD-10-CM

## 2025-05-20 PROCEDURE — 96372 THER/PROPH/DIAG INJ SC/IM: CPT | Performed by: INTERNAL MEDICINE

## 2025-05-20 RX ORDER — CYANOCOBALAMIN 1000 UG/ML
1000 INJECTION, SOLUTION INTRAMUSCULAR; SUBCUTANEOUS
Status: SHIPPED | OUTPATIENT
Start: 2025-05-20

## 2025-05-20 RX ADMIN — CYANOCOBALAMIN 1000 MCG: 1000 INJECTION, SOLUTION INTRAMUSCULAR; SUBCUTANEOUS at 09:18

## 2025-06-09 DIAGNOSIS — M81.0 AGE-RELATED OSTEOPOROSIS WITHOUT CURRENT PATHOLOGICAL FRACTURE: ICD-10-CM

## 2025-06-10 RX ORDER — ALENDRONATE SODIUM 70 MG/1
70 TABLET ORAL
Qty: 12 TABLET | Refills: 1 | Status: SHIPPED | OUTPATIENT
Start: 2025-06-10

## 2025-06-19 ENCOUNTER — CLINICAL SUPPORT (OUTPATIENT)
Dept: INTERNAL MEDICINE CLINIC | Facility: CLINIC | Age: 65
End: 2025-06-19
Payer: MEDICARE

## 2025-06-19 DIAGNOSIS — E53.8 B12 DEFICIENCY: Primary | ICD-10-CM

## 2025-06-19 PROCEDURE — 96372 THER/PROPH/DIAG INJ SC/IM: CPT | Performed by: FAMILY MEDICINE

## 2025-06-19 RX ORDER — CYANOCOBALAMIN 1000 UG/ML
1000 INJECTION, SOLUTION INTRAMUSCULAR; SUBCUTANEOUS ONCE
Status: COMPLETED | OUTPATIENT
Start: 2025-06-19 | End: 2025-06-19

## 2025-06-19 RX ADMIN — CYANOCOBALAMIN 1000 MCG: 1000 INJECTION, SOLUTION INTRAMUSCULAR; SUBCUTANEOUS at 09:11

## 2025-07-18 ENCOUNTER — OFFICE VISIT (OUTPATIENT)
Dept: INTERNAL MEDICINE CLINIC | Facility: CLINIC | Age: 65
End: 2025-07-18

## 2025-07-18 VITALS
HEART RATE: 70 BPM | OXYGEN SATURATION: 100 % | DIASTOLIC BLOOD PRESSURE: 80 MMHG | SYSTOLIC BLOOD PRESSURE: 130 MMHG | WEIGHT: 119 LBS | HEIGHT: 63 IN | BODY MASS INDEX: 21.09 KG/M2

## 2025-07-18 DIAGNOSIS — E53.8 B12 DEFICIENCY: ICD-10-CM

## 2025-07-18 DIAGNOSIS — M81.0 AGE-RELATED OSTEOPOROSIS WITHOUT CURRENT PATHOLOGICAL FRACTURE: ICD-10-CM

## 2025-07-18 DIAGNOSIS — J47.9 BRONCHIECTASIS WITHOUT COMPLICATION (HCC): ICD-10-CM

## 2025-07-18 DIAGNOSIS — N30.00 ACUTE CYSTITIS WITHOUT HEMATURIA: Primary | ICD-10-CM

## 2025-07-18 DIAGNOSIS — D72.818 OTHER DECREASED WHITE BLOOD CELL (WBC) COUNT: ICD-10-CM

## 2025-07-18 DIAGNOSIS — F41.0 PANIC DISORDER WITHOUT AGORAPHOBIA: ICD-10-CM

## 2025-07-18 DIAGNOSIS — I34.0 NONRHEUMATIC MITRAL VALVE REGURGITATION: ICD-10-CM

## 2025-07-18 PROBLEM — D72.819 LEUKOPENIA: Status: ACTIVE | Noted: 2025-07-18

## 2025-07-18 LAB
BILIRUBIN, URINE, POC: NORMAL
BLOOD URINE, POC: NORMAL
GLUCOSE URINE, POC: NEGATIVE
KETONES, URINE, POC: POSITIVE
LEUKOCYTE ESTERASE, URINE, POC: POSITIVE
NITRITE, URINE, POC: NEGATIVE
PH, URINE, POC: 6 (ref 4.6–8)
PROTEIN,URINE, POC: NORMAL
SPECIFIC GRAVITY, URINE, POC: 1.03 (ref 1–1.03)
URINALYSIS CLARITY, POC: CLEAR
URINALYSIS COLOR, POC: YELLOW
UROBILINOGEN, POC: NORMAL

## 2025-07-18 RX ORDER — NITROFURANTOIN 25; 75 MG/1; MG/1
100 CAPSULE ORAL 2 TIMES DAILY
Qty: 10 CAPSULE | Refills: 0 | Status: SHIPPED | OUTPATIENT
Start: 2025-07-18 | End: 2025-07-23

## 2025-07-18 RX ORDER — CYANOCOBALAMIN 1000 UG/ML
1000 INJECTION, SOLUTION INTRAMUSCULAR; SUBCUTANEOUS ONCE
Status: COMPLETED | OUTPATIENT
Start: 2025-07-18 | End: 2025-07-18

## 2025-07-18 RX ORDER — PHENAZOPYRIDINE HYDROCHLORIDE 100 MG/1
100 TABLET, FILM COATED ORAL 3 TIMES DAILY PRN
Qty: 30 TABLET | Refills: 0 | Status: SHIPPED | OUTPATIENT
Start: 2025-07-18 | End: 2025-08-17

## 2025-07-18 RX ORDER — LORAZEPAM 0.5 MG/1
0.5 TABLET ORAL EVERY 6 HOURS PRN
Qty: 30 TABLET | Refills: 2 | Status: SHIPPED | OUTPATIENT
Start: 2025-07-18 | End: 2025-08-17

## 2025-07-18 RX ADMIN — CYANOCOBALAMIN 1000 MCG: 1000 INJECTION, SOLUTION INTRAMUSCULAR; SUBCUTANEOUS at 09:28

## 2025-07-18 NOTE — PROGRESS NOTES
Bakari Hilliard DO  Diplomate of the [employer]  [facility]    Yue Vigil (: 1960) is a 64 y.o. female, here for evaluation of the following chief complaint(s):  Anxiety and Urinary Pain     Assessment & Plan  - Urinary Tract Infection.    - Positive urine culture.    - Prescribed Pyridium and antibiotics.    - Instructed on the proper use of antibiotics.  Also stressed the importance of taking the full course to help prevent the risk of resistance.  Advised taking pro-biotics (yogurt, align or equivalent) while on antibiotics to reduce the risk of C. Diff infection.      - Anxiety.    - Ativan refill provided.  Advised her how to take medication properly.  Discussed the potential side effects--including addiction--and benefits of the medication.  She is to call with any problems or concerns.      - Bronchiectasis    - Lungs clear, no wheezing.    - Continue albuterol as needed, monitor symptoms.  Follow up with Pulmonology as scheduled    - Mitral Regurgitation    - Order echocardiogram to reassess mitral valve.    - Referral to cardiology if condition deteriorates.    - Osteoporosis    - Due for bone density test.    - Leukopenia.    - Stable, continued monitoring per hematology.      - B12 deficiency    - B12 injection received today.    1. Acute cystitis without hematuria  -     LORazepam (ATIVAN) 0.5 MG tablet; Take 1 tablet by mouth every 6 hours as needed for Anxiety for up to 30 days. Max Daily Amount: 2 mg, Disp-30 tablet, R-2Normal  -     AMB POC URINALYSIS DIP STICK AUTO W/O MICRO  -     Culture, Urine; Future  -     nitrofurantoin, macrocrystal-monohydrate, (MACROBID) 100 MG capsule; Take 1 capsule by mouth 2 times daily for 5 days, Disp-10 capsule, R-0Normal  -     phenazopyridine (PYRIDIUM) 100 MG tablet; Take 1 tablet by mouth 3 times daily as needed for Pain, Disp-30 tablet, R-0Normal  2. B12 deficiency  -     cyanocobalamin injection 1,000

## 2025-08-06 ENCOUNTER — HOSPITAL ENCOUNTER (OUTPATIENT)
Dept: MAMMOGRAPHY | Age: 65
Discharge: HOME | End: 2025-08-09
Attending: FAMILY MEDICINE
Payer: MEDICARE

## 2025-08-06 DIAGNOSIS — M81.0 AGE-RELATED OSTEOPOROSIS WITHOUT CURRENT PATHOLOGICAL FRACTURE: ICD-10-CM

## 2025-08-06 PROCEDURE — 77080 DXA BONE DENSITY AXIAL: CPT

## 2025-08-19 ENCOUNTER — CLINICAL SUPPORT (OUTPATIENT)
Dept: INTERNAL MEDICINE CLINIC | Facility: CLINIC | Age: 65
End: 2025-08-19
Payer: MEDICARE

## 2025-08-19 DIAGNOSIS — E53.8 B12 DEFICIENCY: Primary | ICD-10-CM

## 2025-08-19 PROCEDURE — 96372 THER/PROPH/DIAG INJ SC/IM: CPT | Performed by: FAMILY MEDICINE

## 2025-08-19 RX ORDER — CYANOCOBALAMIN 1000 UG/ML
1000 INJECTION, SOLUTION INTRAMUSCULAR; SUBCUTANEOUS ONCE
Status: COMPLETED | OUTPATIENT
Start: 2025-08-19 | End: 2025-08-19

## 2025-08-19 RX ADMIN — CYANOCOBALAMIN 1000 MCG: 1000 INJECTION, SOLUTION INTRAMUSCULAR; SUBCUTANEOUS at 09:11

## 2025-08-22 ENCOUNTER — OFFICE VISIT (OUTPATIENT)
Dept: PULMONOLOGY | Age: 65
End: 2025-08-22

## 2025-08-22 VITALS
OXYGEN SATURATION: 98 % | HEART RATE: 58 BPM | RESPIRATION RATE: 14 BRPM | WEIGHT: 121 LBS | HEIGHT: 63 IN | SYSTOLIC BLOOD PRESSURE: 130 MMHG | BODY MASS INDEX: 21.44 KG/M2 | DIASTOLIC BLOOD PRESSURE: 79 MMHG

## 2025-08-22 DIAGNOSIS — R93.89 ABNORMAL CHEST CT: ICD-10-CM

## 2025-08-22 DIAGNOSIS — R91.1 LUNG NODULE: Primary | ICD-10-CM

## 2025-08-22 DIAGNOSIS — J45.909 UNCOMPLICATED ASTHMA, UNSPECIFIED ASTHMA SEVERITY, UNSPECIFIED WHETHER PERSISTENT: ICD-10-CM

## 2025-09-03 DIAGNOSIS — R93.1 ABNORMAL ECHOCARDIOGRAM: Primary | ICD-10-CM

## (undated) DEVICE — NEEDLE ASPIR 21GA L700MM US GUID TREAT DST END FOR EFFICIENT

## (undated) DEVICE — BRONCHOSCOPES MONARCH

## (undated) DEVICE — Device: Brand: BALLOON APPLICATOR

## (undated) DEVICE — WANG TRANSBRONCHIAL HISTOLOGY NEEDLE FOR PERIPHERAL REGION, 19 G, 1.9 MM X 130 CM: Brand: WANG

## (undated) DEVICE — BLANKET WRM W40.2XL55.9IN IORT LO BODY + MISTRAL AIR

## (undated) DEVICE — KIT INTRODUCER BRONCHOSCOPE PATIENT

## (undated) DEVICE — TUBING FLUIDICS BRONCHOSCOPE

## (undated) DEVICE — MANIFOLD CART ULT HI FLOW W 3 PRT FOR SUCT

## (undated) DEVICE — TOWEL SURG W16XL26IN BLU NONFENESTRATED DLX ST 2 PER PK

## (undated) DEVICE — SYRINGE FLSH IV STD 10 CC W/O CANN PREFILLED NACL POSIFLUSH

## (undated) DEVICE — SET EXTN 0.4ML L7IN MINIBOR PRSS RATE W/ SLDE CLMP SPIN M

## (undated) DEVICE — PATCHES NAVIGATION PATIENT

## (undated) DEVICE — SINGLE USE SUCTION VALVE MAJ-209: Brand: SINGLE USE SUCTION VALVE (STERILE)

## (undated) DEVICE — FORCEPS BIOPSY SMOOTH CUP

## (undated) DEVICE — SINGLE USE BIOPSY VALVE MAJ-210: Brand: SINGLE USE BIOPSY VALVE (STERILE)

## (undated) DEVICE — VALVE SHEATH BRONCHOSCOPE

## (undated) DEVICE — SYRINGE MED 50ML LUERLOCK TIP

## (undated) DEVICE — SURGICAL PROCEDURE KIT BRONCHSCP CUST

## (undated) DEVICE — Device: Brand: PORTEX

## (undated) DEVICE — GARMENT,MEDLINE,DVT,INT,CALF,MED, GEN2: Brand: MEDLINE

## (undated) DEVICE — SYRINGE, LUER SLIP, STERILE, 60ML: Brand: MEDLINE

## (undated) DEVICE — KENDALL RADIOLUCENT FOAM MONITORING ELECTRODE RECTANGULAR SHAPE: Brand: KENDALL